# Patient Record
Sex: MALE | Race: WHITE | Employment: FULL TIME | URBAN - METROPOLITAN AREA
[De-identification: names, ages, dates, MRNs, and addresses within clinical notes are randomized per-mention and may not be internally consistent; named-entity substitution may affect disease eponyms.]

---

## 2017-09-27 RX ORDER — ESCITALOPRAM OXALATE 10 MG/1
20 TABLET ORAL DAILY
Qty: 90 TAB | Refills: 3 | OUTPATIENT
Start: 2017-09-27

## 2017-09-27 NOTE — TELEPHONE ENCOUNTER
Requested Prescriptions     Pending Prescriptions Disp Refills    escitalopram oxalate (LEXAPRO) 10 mg tablet 90 Tab 3     Sig: Take 2 Tabs by mouth daily.        Last Refill: 7-18-16  Last visit:5-16-16

## 2017-10-09 PROBLEM — I10 ESSENTIAL HYPERTENSION: Status: ACTIVE | Noted: 2017-10-09

## 2017-10-09 PROBLEM — N20.0 RECURRENT KIDNEY STONES: Status: ACTIVE | Noted: 2017-10-09

## 2017-10-09 PROBLEM — F41.0 PANIC ANXIETY SYNDROME: Status: ACTIVE | Noted: 2017-10-09

## 2017-10-18 ENCOUNTER — OFFICE VISIT (OUTPATIENT)
Dept: INTERNAL MEDICINE CLINIC | Age: 37
End: 2017-10-18

## 2017-10-18 VITALS
WEIGHT: 285.6 LBS | DIASTOLIC BLOOD PRESSURE: 82 MMHG | HEIGHT: 69 IN | SYSTOLIC BLOOD PRESSURE: 122 MMHG | HEART RATE: 80 BPM | BODY MASS INDEX: 42.3 KG/M2

## 2017-10-18 DIAGNOSIS — I10 ESSENTIAL HYPERTENSION: Primary | ICD-10-CM

## 2017-10-18 DIAGNOSIS — F17.210 CIGARETTE NICOTINE DEPENDENCE WITHOUT COMPLICATION: ICD-10-CM

## 2017-10-18 DIAGNOSIS — F41.0 PANIC ANXIETY SYNDROME: ICD-10-CM

## 2017-10-18 LAB
ALBUMIN SERPL-MCNC: 4.6 G/DL (ref 3.9–5.4)
ALKALINE PHOS POC: 88 U/L (ref 38–126)
ALT SERPL-CCNC: 57 U/L (ref 9–52)
AST SERPL-CCNC: 35 U/L (ref 14–36)
BACTERIA UA POCT, BACTPOCT: NORMAL
BILIRUB UR QL STRIP: NEGATIVE
BUN BLD-MCNC: 10 MG/DL (ref 9–20)
CALCIUM BLD-MCNC: 9.9 MG/DL (ref 8.4–10.2)
CASTS UA POCT: 0
CHLORIDE BLD-SCNC: 103 MMOL/L (ref 98–107)
CHOLEST SERPL-MCNC: 222 MG/DL (ref 0–200)
CLUE CELLS, CLUEPOCT: NEGATIVE
CO2 POC: 27 MMOL/L (ref 22–32)
CREAT BLD-MCNC: 1 MG/DL (ref 0.8–1.5)
CRYSTALS UA POCT, CRYSPOCT: NEGATIVE
EGFR (POC): 95.1
EPITHELIAL CELLS POCT: NEGATIVE
GLUCOSE POC: 85 MG/DL (ref 75–110)
GLUCOSE UR-MCNC: NEGATIVE MG/DL
GRAN# POC: 9.3 K/UL (ref 2–7.8)
GRAN% POC: 71.7 % (ref 37–92)
HCT VFR BLD CALC: 50.3 % (ref 37–51)
HDLC SERPL-MCNC: 54 MG/DL (ref 35–130)
HGB BLD-MCNC: 17.4 G/DL (ref 12–18)
KETONES P FAST UR STRIP-MCNC: NEGATIVE MG/DL
LDL CHOLESTEROL POC: 117.4 MG/DL (ref 0–130)
LY# POC: 3.2 K/UL (ref 0.6–4.1)
LY% POC: 24.8 % (ref 10–58.5)
MCH RBC QN: 32.7 PG (ref 26–32)
MCHC RBC-ENTMCNC: 34.6 G/DL (ref 30–36)
MCV RBC: 95 FL (ref 80–97)
MID #, POC: 0.4 K/UL (ref 0–1.8)
MID% POC: 3.5 % (ref 0.1–24)
MUCUS UA POCT, MUCPOCT: NORMAL
PH UR STRIP: 6.5 [PH] (ref 5–7)
PLATELET # BLD: 303 K/UL (ref 140–440)
POTASSIUM SERPL-SCNC: 4.8 MMOL/L (ref 3.6–5)
PROT SERPL-MCNC: 8 G/DL (ref 6.3–8.2)
PROT UR QL STRIP: NEGATIVE MG/DL
RBC # BLD: 5.3 M/UL (ref 4.2–6.3)
RBC UA POCT, RBCPOCT: NORMAL
SODIUM SERPL-SCNC: 144 MMOL/L (ref 137–145)
SP GR UR STRIP: 1.01 (ref 1.01–1.02)
TCHOL/HDL RATIO (POC): 4.1 (ref 0–4)
TOTAL BILIRUBIN POC: 0.5 MG/DL (ref 0.2–1.3)
TRICH UA POCT, TRICHPOC: NEGATIVE
TRIGL SERPL-MCNC: 253 MG/DL (ref 0–200)
TSH BLD-ACNC: 1.32 UIU/ML (ref 0.4–4.2)
UA UROBILINOGEN AMB POC: NORMAL (ref 0.2–1)
URINALYSIS CLARITY POC: CLEAR
URINALYSIS COLOR POC: NORMAL
URINE BLOOD POC: NEGATIVE
URINE CULT COMMENT, POCT: NORMAL
URINE LEUKOCYTES POC: NEGATIVE
URINE NITRITES POC: NEGATIVE
VLDLC SERPL CALC-MCNC: 50.6 MG/DL
WBC # BLD: 12.9 K/UL (ref 4.1–10.9)
WBC UA POCT, WBCPOCT: 0
YEAST UA POCT, YEASTPOC: NEGATIVE

## 2017-10-18 RX ORDER — ESCITALOPRAM OXALATE 20 MG/1
20 TABLET ORAL DAILY
Qty: 90 TAB | Refills: 3 | Status: SHIPPED | OUTPATIENT
Start: 2017-10-18 | End: 2018-08-06 | Stop reason: SDUPTHER

## 2017-10-18 RX ORDER — LOSARTAN POTASSIUM 50 MG/1
50 TABLET ORAL DAILY
Qty: 90 TAB | Refills: 3 | Status: SHIPPED | OUTPATIENT
Start: 2017-10-18 | End: 2020-05-21

## 2017-10-18 NOTE — PROGRESS NOTES
This note will not be viewable in [address] Subjective:     Jose D Lares presents to the office today in 1 year follow-up of a number of medical issues    The patient has hypertension and remains on losartan. He has tolerated this without cough or lower extremity edema and denies dizziness. Blood pressures when checked outside of the office have been normal.    The patient has a history of panic anxiety syndrome. He took his Lexapro until 4-6 weeks ago and then came off the medication. His symptoms have slowly returned and he wishes to restart his medication. He notes that he has anxiety with intermittent panic but this is not disabling. He had no long-term side effects related to the Lexapro. Past Medical History:   Diagnosis Date    Cigarette nicotine dependence without complication 98/13/1477    Essential hypertension 10/9/2017    Panic anxiety syndrome 10/9/2017    Recurrent kidney stones 10/9/2017     History reviewed. No pertinent surgical history. No Known Allergies  Current Outpatient Prescriptions   Medication Sig Dispense Refill    escitalopram oxalate (LEXAPRO) 20 mg tablet Take 1 Tab by mouth daily. 90 Tab 3    losartan (COZAAR) 50 mg tablet Take 1 Tab by mouth daily.  80 Tab 3     Social History     Social History    Marital status:      Spouse name: N/A    Number of children: N/A    Years of education: N/A     Social History Main Topics    Smoking status: Current Every Day Smoker    Smokeless tobacco: Current User    Alcohol use None    Drug use: None    Sexual activity: Not Asked     Other Topics Concern    None     Social History Narrative     Family History   Problem Relation Age of Onset    Cancer Father     Heart Disease Father        Review of Systems:  GEN: no weight loss, weight gain, fatigue or night sweats  CV: no PND, orthopnea, or palpitations  Resp: no dyspnea on exertion, no cough  Abd: no nausea, vomiting or diarrhea  EXT: denies edema, claudication  Endocrine: no hair loss, excessive thirst or polyuria  Neurological ROS: no TIA or stroke symptoms  Psych: Positive for reoccurance of anxiety with occasional panic  ROS otherwise negative      Objective:     Visit Vitals    /82 (BP 1 Location: Left arm, BP Patient Position: Sitting)    Pulse 80    Ht 5' 9\" (1.753 m)    Wt 285 lb 9.6 oz (129.5 kg)    BMI 42.18 kg/m2     Body mass index is 42.18 kg/(m^2). General:   alert, cooperative and no distress   Eyes: conjunctivae/sclerae clear. PERRL, EOM's intact   Mouth:  No oral lesions, no pharyngeal erythema, no exudates   Neck: Trachea midline, no thyromegaly, no bruits   Heart: S1 and S2 normal,no murmurs noted    Lungs: Clear to auscultation bilaterally, no increased work of breathing   Abdomen: Soft, nontender. Normal bowel sounds   Extremities: No edema or cyanosis   Neuro: ..alert, oriented x3,speech normal in context and clarity, cranial nerves II-XII intact,motor strength: full proximally and distally,gait: normal  reflexes: full and symmetric     Physical exam otherwise negative         Assessment/Plan:     Diagnoses and all orders for this visit:    Essential hypertension  -     AMB POC COMPLETE CBC,AUTOMATED ENTER  -     AMB POC COMPREHENSIVE METABOLIC PANEL  -     AMB POC LIPID PROFILE  -     AMB POC URINALYSIS DIP STICK AUTO W/ MICRO   -     UT COLLECTION VENOUS BLOOD,VENIPUNCTURE  -     losartan (COZAAR) 50 mg tablet; Take 1 Tab by mouth daily. , Normal, Disp-90 Tab, R-3    Panic anxiety syndrome  -     AMB POC TSH  -     escitalopram oxalate (LEXAPRO) 20 mg tablet; Take 1 Tab by mouth daily. , Normal, Disp-90 Tab, R-3    Cigarette nicotine dependence without complication        Other instructions: The patient's medications are reviewed and reconciled and no change in his current medical regimen is made. Refills are given as noted.     No added salt, weight loss are encouraged    He was counseled to discontinue smoking    Await results of multiple labs    Follow-up yearly    Follow-up Disposition:  Return in about 1 year (around 10/18/2018).     Kelley Mcallister MD

## 2017-10-18 NOTE — PATIENT INSTRUCTIONS

## 2017-10-18 NOTE — MR AVS SNAPSHOT
Visit Information Date & Time Provider Department Dept. Phone Encounter #  
 10/18/2017  8:40 AM [facility] [id number] Follow-up Instructions Return in about 1 year (around 10/18/2018). Upcoming Health Maintenance Date Due DTaP/Tdap/Td series (1 - Tdap) 7/13/2000 INFLUENZA AGE 9 TO ADULT 8/1/2017 Allergies as of 10/18/2017  Review Complete On: 10/18/2017 By: Yfn Rodriguez MD  
 No Known Allergies Current Immunizations  Never Reviewed No immunizations on file. Not reviewed this visit You Were Diagnosed With   
  
 Codes Comments Essential hypertension    -  Primary ICD-10-CM: I10 
ICD-9-CM: 401.9 Panic anxiety syndrome     ICD-10-CM: F41.0 ICD-9-CM: 300.01 Cigarette nicotine dependence without complication     PDZ-95-: F17.210 ICD-9-CM: 305.1 Vitals BP Pulse Height(growth percentile) Weight(growth percentile) BMI Smoking Status 122/82 (BP 1 Location: Left arm, BP Patient Position: Sitting) 80 5' 9\" (1.753 m) 285 lb 9.6 oz (129.5 kg) 42.18 kg/m2 Current Every Day Smoker BMI and BSA Data Body Mass Index Body Surface Area  
 42.18 kg/m 2 2.51 m 2 Preferred Pharmacy Pharmacy Name Phone [id number] [facility] [phone] Your Updated Medication List  
  
   
This list is accurate as of: 10/18/17  9:06 AM.  Always use your most recent med list.  
  
  
  
  
 escitalopram oxalate 20 mg tablet Commonly known as:  [place] Take 1 Tab by mouth daily. losartan 50 mg tablet Commonly known as:  COZAAR Take 1 Tab by mouth daily. Prescriptions Sent to Pharmacy Refills  
 escitalopram oxalate (LEXAPRO) 20 mg tablet 3 Sig: Take 1 Tab by mouth daily.   
 Class: Normal  
 Pharmacy: [id number] - [address] , [address] [place] [address] Ph #: [phone] Route: Oral  
 losartan (COZAAR) 50 mg tablet 3 Sig: Take 1 Tab by mouth daily. Class: Normal  
 Pharmacy: [facility] , [address] [place] [address] Ph #: [phone] Route: Oral  
  
We Performed the Following AMB POC COMPLETE CBC,AUTOMATED ENTER [id number] CPT(R)] AMB POC COMPREHENSIVE METABOLIC PANEL [15415 CPT(R)] AMB POC LIPID PROFILE [78996 CPT(R)] AMB POC TSH [32361 CPT(R)] AMB POC URINALYSIS DIP STICK AUTO W/ MICRO  [96990 CPT(R)] MA COLLECTION VENOUS BLOOD,VENIPUNCTURE [id number] CPT(R)] Follow-up Instructions Return in about 1 year (around 10/18/2018). Patient Instructions High Blood Pressure: Care Instructions Your Care Instructions If your blood pressure is usually above 140/90, you have high blood pressure, or hypertension. That means the top number is 140 or higher or the bottom number is 90 or higher, or both. Despite what a lot of people think, high blood pressure usually doesn't cause headaches or make you feel dizzy or lightheaded. It usually has no symptoms. But it does increase your risk for heart attack, stroke, and kidney or eye damage. The higher your blood pressure, the more your risk increases. Your doctor will give you a goal for your blood pressure. Your goal will be based on your health and your age. An example of a goal is to keep your blood pressure below 140/90. Lifestyle changes, such as eating healthy and being active, are always important to help lower blood pressure. You might also take medicine to reach your blood pressure goal. 
Follow-up care is a key part of your treatment and safety. Be sure to make and go to all appointments, and call your doctor if you are having problems.  It's also a good idea to know your test results and keep a list of the medicines you take. How can you care for yourself at home? Medical treatment · If you stop taking your medicine, your blood pressure will go back up. You may take one or more types of medicine to lower your blood pressure. Be safe with medicines. Take your medicine exactly as prescribed. Call your doctor if you think you are having a problem with your medicine. · Talk to your doctor before you start taking aspirin every day. Aspirin can help certain people lower their risk of a heart attack or stroke. But taking aspirin isn't right for everyone, because it can cause serious bleeding. · See your doctor regularly. You may need to see the doctor more often at first or until your blood pressure comes down. · If you are taking blood pressure medicine, talk to your doctor before you take decongestants or anti-inflammatory medicine, such as ibuprofen. Some of these medicines can raise blood pressure. · Learn how to check your blood pressure at home. Lifestyle changes · Stay at a healthy weight. This is especially important if you put on weight around the waist. Losing even 10 pounds can help you lower your blood pressure. · If your doctor recommends it, get more exercise. Walking is a good choice. Bit by bit, increase the amount you walk every day. Try for at least 30 minutes on most days of the week. You also may want to swim, bike, or do other activities. · Avoid or limit alcohol. Talk to your doctor about whether you can drink any alcohol. · Try to limit how much sodium you eat to less than 2,300 milligrams (mg) a day. Your doctor may ask you to try to eat less than 1,500 mg a day. · Eat plenty of fruits (such as bananas and oranges), vegetables, legumes, whole grains, and low-fat dairy products. · Lower the amount of saturated fat in your diet. Saturated fat is found in animal products such as milk, cheese, and meat.  Limiting these foods may help you lose weight and also lower your risk for heart disease. · Do not smoke. Smoking increases your risk for heart attack and stroke. If you need help quitting, talk to your doctor about stop-smoking programs and medicines. These can increase your chances of quitting for good. When should you call for help? Call 911 anytime you think you may need emergency care. This may mean having symptoms that suggest that your blood pressure is causing a serious heart or blood vessel problem. Your blood pressure may be over 180/110. For example, call 911 if: 
· You have symptoms of a heart attack. These may include: ¨ Chest pain or pressure, or a strange feeling in the chest. 
¨ Sweating. ¨ Shortness of breath. ¨ Nausea or vomiting. ¨ Pain, pressure, or a strange feeling in the back, neck, jaw, or upper belly or in one or both shoulders or arms. ¨ Lightheadedness or sudden weakness. ¨ A fast or irregular heartbeat. · You have symptoms of a stroke. These may include: 
¨ Sudden numbness, tingling, weakness, or loss of movement in your face, arm, or leg, especially on only one side of your body. ¨ Sudden vision changes. ¨ Sudden trouble speaking. ¨ Sudden confusion or trouble understanding simple statements. ¨ Sudden problems with walking or balance. ¨ A sudden, severe headache that is different from past headaches. · You have severe back or belly pain. Do not wait until your blood pressure comes down on its own. Get help right away. Call your doctor now or seek immediate care if: 
· Your blood pressure is much higher than normal (such as 180/110 or higher), but you don't have symptoms. · You think high blood pressure is causing symptoms, such as: ¨ Severe headache. ¨ Blurry vision. Watch closely for changes in your health, and be sure to contact your doctor if: 
· Your blood pressure measures 140/90 or higher at least 2 times.  That means the top number is 140 or higher or the bottom number is 90 or higher, or both. · You think you may be having side effects from your blood pressure medicine. · Your blood pressure is usually normal, but it goes above normal at least 2 times. Where can you learn more? Go to http://murray-randy.info/. Enter T266 in the search box to learn more about \"High Blood Pressure: Care Instructions. \" Current as of: August 8, 2016 Content Version: 11.3 © 7539-5749 [employer]. Care instructions adapted under license by [employer] (which disclaims liability or warranty for this information). If you have questions about a medical condition or this instruction, always ask your healthcare professional. [address] any warranty or liability for your use of this information. Introducing [facility]! Kelley Lauren introduces MyQuoteApp patient portal. Now you can access parts of your medical record, email your doctor's office, and request medication refills online. 1. In your internet browser, go to [email or web address] 2. Click on the First Time User? Click Here link in the Sign In box. You will see the New Member Sign Up page. 3. Enter your MyQuoteApp Access Code exactly as it appears below. You will not need to use this code after youve completed the sign-up process. If you do not sign up before the expiration date, you must request a new code. · MyQuoteApp Access Code: [email or web address] Expires: 1/16/2018  8:48 AM 
 
4. Enter the last four digits of your Social Security Number (xxxx) and Date of Birth (mm/dd/yyyy) as indicated and click Submit. You will be taken to the next sign-up page. 5. Create a MyQuoteApp ID. This will be your MyQuoteApp login ID and cannot be changed, so think of one that is secure and easy to remember. 6. Create a MyQuoteApp password. You can change your password at any time. 7. Enter your Password Reset Question and Answer.  This can be used at a later time if you forget your password. 8. Enter your e-mail address. You will receive e-mail notification when new information is available in [address] 9. Click Sign Up. You can now view and download portions of your medical record. 10. Click the Download Summary menu link to download a portable copy of your medical information. If you have questions, please visit the Frequently Asked Questions section of the Fliggo website. Remember, Fliggo is NOT to be used for urgent needs. For medical emergencies, dial 911. Now available from your iPhone and Android! Please provide this summary of care documentation to your next provider. Your primary care clinician is listed as BALJINDER Jo 21. If you have any questions after today's visit, please call [phone].

## 2017-10-18 NOTE — PROGRESS NOTES
Mendel Mcclendon is a 44 y.o. male presenting for Hypertension (1 yr fu)  . 1. Have you been to the ER, urgent care clinic since your last visit? Hospitalized since your last visit? No    2. Have you seen or consulted any other health care providers outside of the [address] [place] [address] since your last visit? Include any pap smears or colon screening. No    No flowsheet data found. No flowsheet data found. PHQ over the last two weeks 10/18/2017   Little interest or pleasure in doing things Not at all   Feeling down, depressed or hopeless Not at all   Total Score PHQ 2 0       There are no discontinued medications.

## 2020-05-21 ENCOUNTER — OFFICE VISIT (OUTPATIENT)
Dept: URGENT CARE | Age: 40
End: 2020-05-21

## 2020-05-21 VITALS — OXYGEN SATURATION: 95 % | RESPIRATION RATE: 18 BRPM | HEART RATE: 66 BPM | TEMPERATURE: 98.5 F

## 2020-05-21 DIAGNOSIS — Z20.822 CLOSE EXPOSURE TO COVID-19 VIRUS: Primary | ICD-10-CM

## 2020-05-21 NOTE — PROGRESS NOTES
This patient was seen in [facility] at [address] [place] [address] Urgent Care while in their vehicle due to COVID-19 pandemic with PPE and focused examination in order to decrease community viral transmission. The patient/guardian gave verbal consent to treat. Socrates Spangler is a 35 y.o. male who presents for COVID-19 testing. Had direct contact with someone at work with + COVID-19. Denies cough, SOB, fever. No PMH. Smoker. The history is provided by the patient. Past Medical History:   Diagnosis Date    Cigarette nicotine dependence without complication 61/41/5225    Essential hypertension 10/9/2017    Panic anxiety syndrome 10/9/2017    Recurrent kidney stones 10/9/2017        History reviewed. No pertinent surgical history.       Family History   Problem Relation Age of Onset    Cancer Father     Heart Disease Father         Social History     Socioeconomic History    Marital status:      Spouse name: Not on file    Number of children: Not on file    Years of education: Not on file    Highest education level: Not on file   Occupational History    Not on file   Social Needs    Financial resource strain: Not on file    Food insecurity     Worry: Not on file     Inability: Not on file    Transportation needs     Medical: Not on file     Non-medical: Not on file   Tobacco Use    Smoking status: Current Every Day Smoker    Smokeless tobacco: Current User   Substance and Sexual Activity    Alcohol use: Not on file    Drug use: Not on file    Sexual activity: Not on file   Lifestyle    Physical activity     Days per week: Not on file     Minutes per session: Not on file    Stress: Not on file   Relationships    Social connections     Talks on phone: Not on file     Gets together: Not on file     Attends Hoahaoism service: Not on file     Active member of club or organization: Not on file     Attends meetings of clubs or organizations: Not on file     Relationship status: Not on file    Intimate partner violence     Fear of current or ex partner: Not on file     Emotionally abused: Not on file     Physically abused: Not on file     Forced sexual activity: Not on file   Other Topics Concern    Not on file   Social History Narrative    Not on file                ALLERGIES: Patient has no known allergies. Review of Systems   Constitutional: Negative for chills and fever. HENT: Negative for congestion and sore throat. Respiratory: Negative for cough, shortness of breath and wheezing. Cardiovascular: Negative for chest pain. Gastrointestinal: Negative for abdominal pain, nausea and vomiting. Musculoskeletal: Negative for myalgias. Neurological: Negative for headaches. Vitals:    05/21/20 1321 05/21/20 1343   Pulse: (!) 122 66   Resp: 18    Temp: 98.5 °F (36.9 °C)    SpO2: 91% 95%       Physical Exam  Vitals signs and nursing note reviewed. Constitutional:       General: He is not in acute distress. Appearance: He is well-developed. He is not diaphoretic. Pulmonary:      Effort: Pulmonary effort is normal. No respiratory distress. Breath sounds: Normal breath sounds. No stridor. No wheezing, rhonchi or rales. Neurological:      Mental Status: He is alert. Psychiatric:         Behavior: Behavior normal.         Thought Content:  Thought content normal.         Judgment: Judgment normal.         MDM    ICD-10-CM ICD-9-CM   1. Close exposure to COVID-19 virus Z20.828 V01.79       Orders Placed This Encounter    NOVEL CORONAVIRUS (COVID-19)     Scheduling Instructions:      1) Due to current limited availability of the COVID-19 PCR test, tests will be prioritized and may not be completed.              2) Order only if the test result will change clinical management or necessary for a return to mission-critical employment decision.              3) Print and instruct patient to adhere to [facility] home isolation program. (Link Above)              4) Set up or refer patient for a monitoring program.              5) Have patient sign up for and leverage MyChart (if not previously done). Quarantine, await result  Deep breathing exercises    If signs and symptoms become worse the pt is to go to the ER.          Procedures

## 2020-05-24 LAB — SARS-COV-2, NAA: NOT DETECTED

## 2021-04-06 ENCOUNTER — OFFICE VISIT (OUTPATIENT)
Dept: INTERNAL MEDICINE CLINIC | Age: 41
End: 2021-04-06
Payer: COMMERCIAL

## 2021-04-06 VITALS
HEART RATE: 107 BPM | OXYGEN SATURATION: 96 % | BODY MASS INDEX: 44.97 KG/M2 | SYSTOLIC BLOOD PRESSURE: 144 MMHG | HEIGHT: 69 IN | TEMPERATURE: 98.8 F | RESPIRATION RATE: 16 BRPM | DIASTOLIC BLOOD PRESSURE: 110 MMHG | WEIGHT: 303.6 LBS

## 2021-04-06 DIAGNOSIS — Z71.3 DIETARY COUNSELING: ICD-10-CM

## 2021-04-06 DIAGNOSIS — G47.33 OSA (OBSTRUCTIVE SLEEP APNEA): ICD-10-CM

## 2021-04-06 DIAGNOSIS — I10 ESSENTIAL HYPERTENSION: Primary | ICD-10-CM

## 2021-04-06 DIAGNOSIS — E78.00 HYPERCHOLESTEROLEMIA: ICD-10-CM

## 2021-04-06 DIAGNOSIS — Z72.0 TOBACCO ABUSE: ICD-10-CM

## 2021-04-06 DIAGNOSIS — E66.01 MORBID OBESITY (HCC): ICD-10-CM

## 2021-04-06 DIAGNOSIS — R06.2 WHEEZING: ICD-10-CM

## 2021-04-06 DIAGNOSIS — Z71.6 TOBACCO ABUSE COUNSELING: ICD-10-CM

## 2021-04-06 DIAGNOSIS — R40.0 DAYTIME SOMNOLENCE: ICD-10-CM

## 2021-04-06 DIAGNOSIS — R00.0 TACHYCARDIA: ICD-10-CM

## 2021-04-06 DIAGNOSIS — R06.83 LOUD SNORING: ICD-10-CM

## 2021-04-06 PROCEDURE — 99214 OFFICE O/P EST MOD 30 MIN: CPT | Performed by: NURSE PRACTITIONER

## 2021-04-06 PROCEDURE — 93000 ELECTROCARDIOGRAM COMPLETE: CPT | Performed by: NURSE PRACTITIONER

## 2021-04-06 RX ORDER — ALBUTEROL SULFATE 90 UG/1
1 AEROSOL, METERED RESPIRATORY (INHALATION)
Qty: 1 INHALER | Refills: 5 | Status: SHIPPED | OUTPATIENT
Start: 2021-04-06 | End: 2022-06-06 | Stop reason: SDUPTHER

## 2021-04-06 RX ORDER — METOPROLOL SUCCINATE 50 MG/1
50 TABLET, EXTENDED RELEASE ORAL DAILY
Qty: 30 TAB | Refills: 2 | Status: SHIPPED | OUTPATIENT
Start: 2021-04-06 | End: 2021-04-19 | Stop reason: DRUGHIGH

## 2021-04-06 NOTE — PROGRESS NOTES
Mauricio Paula is a 38 y.o. male    Chief Complaint   Patient presents with    [facility]     new patient    Sleep Problem    Labs       Visit Vitals  BP (!) 144/110 (BP 1 Location: Left upper arm, BP Patient Position: Sitting, BP Cuff Size: Large adult)   Pulse (!) 107   Temp 98.8 °F (37.1 °C)   Resp 16   Ht 5' 9\" (1.753 m)   Wt 303 lb 9.6 oz (137.7 kg)   SpO2 96%   BMI 44.83 kg/m²           1. Have you been to the ER, urgent care clinic since your last visit? Hospitalized since your last visit? No     2. Have you seen or consulted any other health care providers outside of the [address] [place] [address] since your last visit? Include any pap smears or colon screening.  No

## 2021-04-06 NOTE — PROGRESS NOTES
Establish Care (new patient), Sleep Problem, and Labs       HPI:     Aleisha Sandra is a 38y.o. year old male who is here to establish as a new patient to me, and to discuss current medical concerns and history. The patient states he has previously been treated for hypertension. He states he was taking medication for a period of time, but then did not follow-up accordingly, and ran out of his medicine. This has been over 2 years ago. He states he has not been checking his blood pressure since that time, but is concerned about his need for medication to treat. He also states a history of obstructive sleep apnea, but does not have a CPAP machine. He states he snores excessively and loudly at night, and has a witnessed episode of apnea. He would like a repeat sleep study done if possible. In addition, the patient continues to smoke despite health risks. He does have a desire to quit smoking, but has not tried any options to date. He does state that he often wakes up wheezing in the morning, and has to cough quite a bit to clear his lungs in the morning. He has tried an old albuterol inhaler which he states is helpful to reduce his wheezing. He denies any chest pain, chest pressure, palpitations, shortness of breath, headaches, severe dizziness, or syncope episodes. He does drink coffee in the morning, and occasionally Red Bulls or other caffeinated beverages in the daytime. Visit Vitals  BP (!) 144/110 (BP 1 Location: Left upper arm, BP Patient Position: Sitting, BP Cuff Size: Large adult)   Pulse (!) 107   Temp 98.8 °F (37.1 °C)   Resp 16   Ht 5' 9\" (1.753 m)   Wt 303 lb 9.6 oz (137.7 kg)   SpO2 96%   BMI 44.83 kg/m²       Past Medical History:   Diagnosis Date    Cigarette nicotine dependence without complication 54/99/0693    Essential hypertension 10/9/2017    Panic anxiety syndrome 10/9/2017    Recurrent kidney stones 10/9/2017       History reviewed.  No pertinent surgical history. Prior to Admission medications    Medication Sig Start Date End Date Taking? Authorizing Provider   albuterol (PROVENTIL HFA, VENTOLIN HFA, PROAIR HFA) 90 mcg/actuation inhaler Take 1 Puff by inhalation every four (4) hours as needed for Wheezing. Indications: WHEEZING 4/6/21  Yes Rosy BRYSON NP   metoprolol succinate (TOPROL-XL) 50 mg XL tablet Take 1 Tab by mouth daily. Indications: rapid ventricular heartbeat 4/6/21  Yes Candi Chapin NP        No Known Allergies     Social History     Socioeconomic History    Marital status:      Spouse name: Not on file    Number of children: Not on file    Years of education: Not on file    Highest education level: Not on file   Occupational History    Not on file   Social Needs    Financial resource strain: Not on file    Food insecurity     Worry: Not on file     Inability: Not on file    Transportation needs     Medical: Not on file     Non-medical: Not on file   Tobacco Use    Smoking status: Current Every Day Smoker    Smokeless tobacco: Current User   Substance and Sexual Activity    Alcohol use: Yes    Drug use: Not on file    Sexual activity: Not on file   Lifestyle    Physical activity     Days per week: Not on file     Minutes per session: Not on file    Stress: Not on file   Relationships    Social connections     Talks on phone: Not on file     Gets together: Not on file     Attends Yazidism service: Not on file     Active member of club or organization: Not on file     Attends meetings of clubs or organizations: Not on file     Relationship status: Not on file    Intimate partner violence     Fear of current or ex partner: Not on file     Emotionally abused: Not on file     Physically abused: Not on file     Forced sexual activity: Not on file   Other Topics Concern    Not on file   Social History Narrative    Not on file        ROS:     Constitutional: He denies fevers, weight loss, or night sweats.   Eyes: No blurred or double vision. ENT: No difficulty with swallowing, taste, speech or smell. Respiratory: Positive for coughing and wheezing, but without significant shortness of breath. Cardiovascular: Denies chest pain, palpitations, unexplained indigestion or syncope. Gastrointestinal:  No changes in bowel movements, no abdominal pain, no bloating. Neurological:  No syncope, dizziness or frequent headache  Skin:  No recent rashes or unexplained bruising. Psychiatric/Behavioral:  Negative for depression. Hematologic: no easy bruising or bleeding gums  Lymphatic: no lymph node enlargement or night sweats  Endocrine: no increased urination or increased thirst, w/o rapid weight change and no night sweats      Physical Examination:     Vitals:    04/06/21 0813   BP: (!) 144/110   Pulse: (!) 107   Resp: 16   Temp: 98.8 °F (37.1 °C)   SpO2: 96%   Weight: 303 lb 9.6 oz (137.7 kg)   Height: 5' 9\" (1.753 m)   PainSc:   0 - No pain        General appearance - alert, well appearing, and in no apparent distress  Mental status - alert, oriented to person, place, and time  Eyes - pupillary responses were normal.  Extraocular muscle function intact. Lids and conjunctiva not injected. Neck - supple without thyromegaly or burit. No JVD noted  Lungs -diffuse wheezing to auscultation. No sounds of consolidation heard  Cardiac-tachycardic rate, regular rhythm without significant murmurs. PMI not displaced. No gallop, rub or click  Lymphatics - no palpable lymphadenopathy. Skin - no rash or unusual bruising noted  Neurological - Cranial nerves II-XII grossly intact. Station and gait normal  Psychiatric -pleasant and cooperative. Time: Duration of encounter was approximately 35 to 40 minutes in which patient was counseled and educated extensively on health risks related to: Morbid obesity, uncontrolled hypertension, uncontrolled sleep apnea, and tobacco abuse.   Patient states improved understanding of health risks related to these issues. Assessment/Plan:     Orders Placed This Encounter    75173 - At Home Sleep Study    CBC W/O DIFF     Standing Status:   Future     Number of Occurrences:   1     Standing Expiration Date:   4/6/2022    TSH 3RD GENERATION     Standing Status:   Future     Number of Occurrences:   1     Standing Expiration Date:   4/6/2022    URINALYSIS W/ RFLX MICROSCOPIC     Standing Status:   Future     Number of Occurrences:   1     Standing Expiration Date:   4/6/2022    MICROALBUMIN, UR, RAND    LIPID PANEL     Standing Status:   Future     Number of Occurrences:   1     Standing Expiration Date:   2/9/6639    METABOLIC PANEL, COMPREHENSIVE     Standing Status:   Future     Number of Occurrences:   1     Standing Expiration Date:   4/6/2022    AMB POC EKG ROUTINE W/ 12 LEADS, INTER & REP     Order Specific Question:   Reason for Exam:     Answer:   htn    albuterol (PROVENTIL HFA, VENTOLIN HFA, PROAIR HFA) 90 mcg/actuation inhaler     Sig: Take 1 Puff by inhalation every four (4) hours as needed for Wheezing. Indications: WHEEZING     Dispense:  1 Inhaler     Refill:  5    metoprolol succinate (TOPROL-XL) 50 mg XL tablet     Sig: Take 1 Tab by mouth daily. Indications: rapid ventricular heartbeat     Dispense:  30 Tab     Refill:  2        ICD-10-CM ICD-9-CM    1. Essential hypertension  I10 401.9 CBC W/O DIFF      TSH 3RD GENERATION      URINALYSIS W/ RFLX MICROSCOPIC      MICROALBUMIN, UR, RAND      LIPID PANEL      METABOLIC PANEL, COMPREHENSIVE      CBC W/O DIFF      TSH 3RD GENERATION      URINALYSIS W/ RFLX MICROSCOPIC      LIPID PANEL      METABOLIC PANEL, COMPREHENSIVE      AMB POC EKG ROUTINE W/ 12 LEADS, INTER & REP   2. Tachycardia  R00.0 785.0 TSH 3RD GENERATION      TSH 3RD GENERATION      AMB POC EKG ROUTINE W/ 12 LEADS, INTER & REP      metoprolol succinate (TOPROL-XL) 50 mg XL tablet   3.  LAURA (obstructive sleep apnea)  G47.33 327.23 SLEEP STUDY, UNATTENDED, SIMUL RECORD HR/O2 SAT/RESP FLOW/RESP EFF   4. Morbid obesity (HCC)  E66.01 278.01 TSH 3RD GENERATION      LIPID PANEL      METABOLIC PANEL, COMPREHENSIVE      TSH 3RD GENERATION      LIPID PANEL      METABOLIC PANEL, COMPREHENSIVE   5. Tobacco abuse  Z72.0 305.1    6. Tobacco abuse counseling  Z71.6 V65.42      305.1    7. Dietary counseling  Z71.3 V65.3    8. Wheezing  R06.2 786.07 albuterol (PROVENTIL HFA, VENTOLIN HFA, PROAIR HFA) 90 mcg/actuation inhaler   9. Loud snoring  R06.83 786.09 SLEEP STUDY, UNATTENDED, SIMUL RECORD HR/O2 SAT/RESP FLOW/RESP EFF   10. Daytime somnolence  R40.0 780.54 SLEEP STUDY, UNATTENDED, SIMUL RECORD HR/O2 SAT/RESP FLOW/RESP EFF     1: We will do baseline labs today including: CBC, CMP, lipid panel, TSH, urinalysis, and microalbumin. 2: Patient to start on Toprol-XL 50 mg daily for management of tachycardia and elevated blood pressure. I have explained to him the likely need for additional medication to improve blood pressure as well. Patient acknowledges. 3: Patient will be given a prescription for albuterol inhaler to use as needed for wheezing. 4: Smoking cessation discussed. Patient will consider in future. 5: Dietary counseling performed. Patient to try diabetic diet which is low in carbohydrates, but without elimination. 6: Patient to monitor blood pressures twice daily and record results. Bring to next appointment. 7: EKG performed in office today. No significant ST elevation/depression. No signs of T wave ischemia. 8: We will arrange for sleep study due to history of LAURA, loud snoring, and morbid obesity. 9: Patient to follow-up with me in approximately 2 weeks, or sooner as needed. Patient states understanding and agrees with plan. I have reviewed the patient's medical history in detail and updated the computerized patient record. We had a prolonged discussion about these complex clinical issues and went over the various important aspects to consider. All questions were answered. Advised him to call back or return to office if symptoms do not improve, change in nature, or persist.    He was given an after visit summary or informed of [employer] [facility] which includes patient instructions, diagnoses, current medications, & vitals. Signed,  Caesar Butler.  Jacky Santillan, MSN APRN [facility]

## 2021-04-07 LAB
APPEARANCE UR: CLEAR
BILIRUB UR QL STRIP: NEGATIVE
COLOR UR: YELLOW
GLUCOSE UR QL: NEGATIVE
HGB UR QL STRIP: NEGATIVE
KETONES UR QL STRIP: NEGATIVE
LEUKOCYTE ESTERASE UR QL STRIP: NEGATIVE
MICRO URNS: NORMAL
MICROALBUMIN UR-MCNC: 5.6 UG/ML
NITRITE UR QL STRIP: NEGATIVE
PH UR STRIP: 5.5 [PH] (ref 5–7.5)
PROT UR QL STRIP: NEGATIVE
SP GR UR: 1.02 (ref 1–1.03)
UROBILINOGEN UR STRIP-MCNC: 0.2 MG/DL (ref 0.2–1)

## 2021-04-09 ENCOUNTER — APPOINTMENT (OUTPATIENT)
Dept: INTERNAL MEDICINE CLINIC | Age: 41
End: 2021-04-09

## 2021-04-10 LAB
ALBUMIN SERPL-MCNC: 4.7 G/DL (ref 4–5)
ALBUMIN/GLOB SERPL: 1.5 {RATIO} (ref 1.2–2.2)
ALP SERPL-CCNC: 101 IU/L (ref 39–117)
ALT SERPL-CCNC: 54 IU/L (ref 0–44)
AST SERPL-CCNC: 29 IU/L (ref 0–40)
BASOPHILS # BLD AUTO: 0.1 X10E3/UL (ref 0–0.2)
BASOPHILS NFR BLD AUTO: 1 %
BILIRUB SERPL-MCNC: 0.2 MG/DL (ref 0–1.2)
BUN SERPL-MCNC: 16 MG/DL (ref 6–24)
BUN/CREAT SERPL: 16 (ref 9–20)
CALCIUM SERPL-MCNC: 9.6 MG/DL (ref 8.7–10.2)
CHLORIDE SERPL-SCNC: 100 MMOL/L (ref 96–106)
CHOLEST SERPL-MCNC: 237 MG/DL (ref 100–199)
CO2 SERPL-SCNC: 23 MMOL/L (ref 20–29)
CREAT SERPL-MCNC: 1 MG/DL (ref 0.76–1.27)
EOSINOPHIL # BLD AUTO: 0.3 X10E3/UL (ref 0–0.4)
EOSINOPHIL NFR BLD AUTO: 2 %
ERYTHROCYTE [DISTWIDTH] IN BLOOD BY AUTOMATED COUNT: 12.9 % (ref 11.6–15.4)
GLOBULIN SER CALC-MCNC: 3.2 G/DL (ref 1.5–4.5)
GLUCOSE SERPL-MCNC: 83 MG/DL (ref 65–99)
HCT VFR BLD AUTO: 52.5 % (ref 37.5–51)
HDLC SERPL-MCNC: 50 MG/DL
HGB BLD-MCNC: 18.3 G/DL (ref 13–17.7)
IMM GRANULOCYTES # BLD AUTO: 0.1 X10E3/UL (ref 0–0.1)
IMM GRANULOCYTES NFR BLD AUTO: 1 %
LDLC SERPL CALC-MCNC: 153 MG/DL (ref 0–99)
LYMPHOCYTES # BLD AUTO: 3.1 X10E3/UL (ref 0.7–3.1)
LYMPHOCYTES NFR BLD AUTO: 27 %
MCH RBC QN AUTO: 32 PG (ref 26.6–33)
MCHC RBC AUTO-ENTMCNC: 34.9 G/DL (ref 31.5–35.7)
MCV RBC AUTO: 92 FL (ref 79–97)
MONOCYTES # BLD AUTO: 0.7 X10E3/UL (ref 0.1–0.9)
MONOCYTES NFR BLD AUTO: 6 %
NEUTROPHILS # BLD AUTO: 7.3 X10E3/UL (ref 1.4–7)
NEUTROPHILS NFR BLD AUTO: 63 %
PLATELET # BLD AUTO: 341 X10E3/UL (ref 150–450)
POTASSIUM SERPL-SCNC: 4.7 MMOL/L (ref 3.5–5.2)
PROT SERPL-MCNC: 7.9 G/DL (ref 6–8.5)
RBC # BLD AUTO: 5.72 X10E6/UL (ref 4.14–5.8)
SODIUM SERPL-SCNC: 139 MMOL/L (ref 134–144)
TRIGL SERPL-MCNC: 187 MG/DL (ref 0–149)
TSH SERPL DL<=0.005 MIU/L-ACNC: 1.05 UIU/ML (ref 0.45–4.5)
VLDLC SERPL CALC-MCNC: 34 MG/DL (ref 5–40)
WBC # BLD AUTO: 11.5 X10E3/UL (ref 3.4–10.8)

## 2021-04-12 RX ORDER — ATORVASTATIN CALCIUM 10 MG/1
10 TABLET, FILM COATED ORAL DAILY
Qty: 30 TAB | Refills: 5 | Status: SHIPPED | OUTPATIENT
Start: 2021-04-12 | End: 2021-11-08

## 2021-04-12 NOTE — PROGRESS NOTES
Your blood count shows that you are developing a condition secondary to smoking called polycythemia. This is in excess of hematocrit and hemoglobin. This is unhealthy, as smoking will cause the body to increase blood production unnecessarily. Increased counts can cause increased risk for heart attack and stroke. I would suggest to work on smoking cessation. Additionally, I would like you to start taking a low-dose 81 mg enteric-coated aspirin each day. This will help to keep the blood a bit thinner. Make sure you hydrate well each day with plenty of water. Thyroid function appears to be normal.    Your cholesterol levels are significantly elevated above normal.  To help protect you, I would suggest taking a medication that will help to lower these numbers to more appropriate levels. I will send this to your pharmacy. Take 1 tablet each night. Work on [employer] such as low-fat/low-cholesterol, reduction in red meat, plenty of vegetables that are rich in color, and plenty of fiber. Kidney functions appear normal.  Electrolytes are normal.  There is a slight elevation in one of your liver enzymes. We will keep an eye on this. Urinalysis is okay. Once cholesterol medication has started, we will need to recheck your labs in 3 months. Please make an appointment to arrange for this.

## 2021-04-19 ENCOUNTER — OFFICE VISIT (OUTPATIENT)
Dept: INTERNAL MEDICINE CLINIC | Age: 41
End: 2021-04-19
Payer: COMMERCIAL

## 2021-04-19 VITALS
OXYGEN SATURATION: 96 % | DIASTOLIC BLOOD PRESSURE: 92 MMHG | BODY MASS INDEX: 44.82 KG/M2 | TEMPERATURE: 98.4 F | HEART RATE: 82 BPM | RESPIRATION RATE: 16 BRPM | WEIGHT: 302.6 LBS | HEIGHT: 69 IN | SYSTOLIC BLOOD PRESSURE: 133 MMHG

## 2021-04-19 DIAGNOSIS — E66.01 MORBID OBESITY (HCC): ICD-10-CM

## 2021-04-19 DIAGNOSIS — Z71.6 TOBACCO ABUSE COUNSELING: ICD-10-CM

## 2021-04-19 DIAGNOSIS — I10 ESSENTIAL HYPERTENSION: Primary | ICD-10-CM

## 2021-04-19 DIAGNOSIS — R06.2 WHEEZING: ICD-10-CM

## 2021-04-19 DIAGNOSIS — Z72.0 TOBACCO ABUSE: ICD-10-CM

## 2021-04-19 DIAGNOSIS — E78.00 HYPERCHOLESTEROLEMIA: ICD-10-CM

## 2021-04-19 PROCEDURE — 99214 OFFICE O/P EST MOD 30 MIN: CPT | Performed by: NURSE PRACTITIONER

## 2021-04-19 RX ORDER — METOPROLOL SUCCINATE 100 MG/1
100 TABLET, EXTENDED RELEASE ORAL DAILY
Qty: 90 TAB | Refills: 1 | Status: SHIPPED | OUTPATIENT
Start: 2021-04-19 | End: 2021-10-22

## 2021-04-19 NOTE — PROGRESS NOTES
Larry Velasco is a 38 y.o. male    Chief Complaint   Patient presents with    Blood Pressure Check     2 wk follow up       Visit Vitals  BP (!) 133/92 (BP 1 Location: Left upper arm, BP Patient Position: Sitting, BP Cuff Size: Large adult)   Pulse 82   Temp 98.4 °F (36.9 °C)   Resp 16   Ht 5' 9\" (1.753 m)   Wt 302 lb 9.6 oz (137.3 kg)   SpO2 96%   BMI 44.69 kg/m²           1. Have you been to the ER, urgent care clinic since your last visit? Hospitalized since your last visit? No     2. Have you seen or consulted any other health care providers outside of the [address] [place] [address] since your last visit? Include any pap smears or colon screening.  No

## 2021-04-19 NOTE — PROGRESS NOTES
Chief Complaint   Patient presents with    Blood Pressure Check     2 wk follow up       SUBJECTIVE:    Larry Velasco is a 38 y.o. male who is here today for a follow up appointment regarding his hypertension, hypercholesterolemia, tobacco abuse, and obesity. At our last encounter, the patient was found to be quite hypertensive. He was placed on Toprol XL 50 mg daily and suggested to monitor his blood pressures on a regular basis. He states he had purchased a blood pressure monitor from [employer], but did not receive it until just recently. He plans on starting to do regular checks in the near future. He states he is tolerating the medication well, and denies any adverse side effects. He appreciates this, as the previous medication he had taken had caused some issues for him. He is currently being managed for hyperlipidemia, and is compliant with current medication(s) as listed. He denies any adverse side effects of medication(s) at this time, and states He is tolerating them well. The patient denies any symptoms of muscle or joint pain, weakness, change in bowel patterns, abdominal pain, or headaches. He is not entirely compliant with a low fat/low cholesterol diet at this time, but working on it, and is not purposefully exercising on a regular basis. He has had no appreciable weight loss since our last encounter. Additionally, the patient was found to be mildly polycythemic via his labs. This is likely due to continued smoking. I have discussed this with him today, and have advised smoking cessation, as well as starting an 81 mg baby aspirin daily. He states he will start the aspirin, and discuss smoking cessation options with me at a later time. The patient also has symptoms of sleep apnea, and has not completed his sleep study at this time. Current Outpatient Medications   Medication Sig Dispense Refill    metoprolol succinate (TOPROL-XL) 100 mg tablet Take 1 Tab by mouth daily. Indications: high blood pressure 90 Tab 1    atorvastatin (LIPITOR) 10 mg tablet Take 1 Tab by mouth daily. Indications: high cholesterol 30 Tab 5    albuterol (PROVENTIL HFA, VENTOLIN HFA, PROAIR HFA) 90 mcg/actuation inhaler Take 1 Puff by inhalation every four (4) hours as needed for Wheezing. Indications: WHEEZING 1 Inhaler 5     Past Medical History:   Diagnosis Date    Cigarette nicotine dependence without complication 82/77/9159    Essential hypertension 10/9/2017    Panic anxiety syndrome 10/9/2017    Recurrent kidney stones 10/9/2017     History reviewed. No pertinent surgical history. No Known Allergies    REVIEW OF SYSTEMS:  General: He denies any chills, fever, weight loss or gain, appetite or sleeping habits. Eyes: No blurred or visual changes  Respiratory: Negative for - acute cough, hemoptysis, shortness of breath, or wheezing  Cardiovascular : Negative for - acute chest pain, orthopnea, edema, palpitations, or shortness of breath  Musculoskeletal: Negative for myalgias or joint pain. Neurological: Negative for - TIA or stroke symptoms  Hematologic: Denies unexplained bruises or bleeding  Endocrine: Denies malaise/lethargy, hot/cold intolerance, polyuria/polydipsia, or unexpected weight changes.     Social History     Socioeconomic History    Marital status:      Spouse name: Not on file    Number of children: Not on file    Years of education: Not on file    Highest education level: Not on file   Tobacco Use    Smoking status: Current Every Day Smoker    Smokeless tobacco: Current User   Substance and Sexual Activity    Alcohol use: Yes     Family History   Problem Relation Age of Onset    Cancer Father     Heart Disease Father        OBJECTIVE:     Visit Vitals  BP (!) 133/92 (BP 1 Location: Left upper arm, BP Patient Position: Sitting, BP Cuff Size: Large adult)   Pulse 82   Temp 98.4 °F (36.9 °C)   Resp 16   Ht 5' 9\" (1.753 m)   Wt 302 lb 9.6 oz (137.3 kg)   SpO2 96% BMI 44.69 kg/m²       Constitutional: He appears well nourished, of stated age, and dressed appropriately. Eyes: Sclera anicteric, PERRLA, EOMI  Neck: Supple without lymphadenopathy. Respiratory: Slight wheezing to right side on ascultation; left is clear. Normal inspiratory effort. Cardiovascular: Regular rate and rhythm, no murmurs, rubs or gallops, PMI not displaced, No thrills, no peripheral edema  Lymphatic: No lymph node enlargemant. Neuro: Non-focal exam, A & O X 3.  Station and gait normal.  Psychiatric: Appropriate affect and demeanor, pleasant and cooperative. Patient's thought content and thought processing appear to be within normal limits. ASSESSMENT/PLAN:       ICD-10-CM ICD-9-CM    1. Essential hypertension  I10 401.9 metoprolol succinate (TOPROL-XL) 100 mg tablet   2. Morbid obesity ([facility] [address]  E66.01 278.01    3. Tobacco abuse  Z72.0 305.1    4. Tobacco abuse counseling  Z71.6 V65.42      305.1    5. Wheezing  R06.2 786.07    6. Hypercholesterolemia  E78.00 272.0      1: Blood pressure mildly improved, but will require further adjustment. Patient to increase Toprol-XL to 100 mg daily. Prescription sent to pharmacy. 2: Patient to monitor blood pressures twice daily and record results. Bring to next appointment. 3: Patient to start 81 mg ASA EC daily. Stay well-hydrated. 4: Patient to continue healthy lifestyle management including: Low-fat/low-cholesterol diet, caloric reduction, regular exercise, focused on weight loss. 5: Patient to follow-up with me in approximately 3 months, or sooner as needed. Patient states understanding and agrees with plan. Orders Placed This Encounter    metoprolol succinate (TOPROL-XL) 100 mg tablet         ATTENTION:   This medical record was transcribed using an electronic medical records system. Although proofread, it may and can contain electronic and spelling errors. Other human spelling and other errors may be present.   Corrections may be executed at a later time. Please feel free to contact us for any clarifications as needed. Signed,  Diane Stewart.  Dearl Chin, MSN APRN FNP-BC

## 2021-07-30 ENCOUNTER — OFFICE VISIT (OUTPATIENT)
Dept: INTERNAL MEDICINE CLINIC | Age: 41
End: 2021-07-30
Payer: COMMERCIAL

## 2021-07-30 VITALS
OXYGEN SATURATION: 97 % | RESPIRATION RATE: 16 BRPM | TEMPERATURE: 99 F | WEIGHT: 315 LBS | BODY MASS INDEX: 46.65 KG/M2 | HEART RATE: 87 BPM | HEIGHT: 69 IN | DIASTOLIC BLOOD PRESSURE: 88 MMHG | SYSTOLIC BLOOD PRESSURE: 130 MMHG

## 2021-07-30 DIAGNOSIS — Z72.0 TOBACCO ABUSE: ICD-10-CM

## 2021-07-30 DIAGNOSIS — E66.01 MORBID OBESITY (HCC): ICD-10-CM

## 2021-07-30 DIAGNOSIS — G47.33 OSA (OBSTRUCTIVE SLEEP APNEA): ICD-10-CM

## 2021-07-30 DIAGNOSIS — Z71.3 DIETARY COUNSELING: ICD-10-CM

## 2021-07-30 DIAGNOSIS — Z71.6 TOBACCO ABUSE COUNSELING: ICD-10-CM

## 2021-07-30 DIAGNOSIS — I10 ESSENTIAL HYPERTENSION: Primary | ICD-10-CM

## 2021-07-30 DIAGNOSIS — D75.1 POLYCYTHEMIA SECONDARY TO SMOKING: ICD-10-CM

## 2021-07-30 DIAGNOSIS — Z79.899 ON STATIN THERAPY: ICD-10-CM

## 2021-07-30 DIAGNOSIS — E78.00 HYPERCHOLESTEROLEMIA: ICD-10-CM

## 2021-07-30 DIAGNOSIS — R06.2 WHEEZING: ICD-10-CM

## 2021-07-30 PROCEDURE — 99214 OFFICE O/P EST MOD 30 MIN: CPT | Performed by: NURSE PRACTITIONER

## 2021-07-30 NOTE — PROGRESS NOTES
Chief Complaint   Patient presents with    Blood Pressure Check     3 month follow up       SUBJECTIVE:    Marcos Ferro is a 42 y.o. male who is here today for a follow up appointment regarding his hypertension, hyper cholesterolemia, LAURA with CPAP, morbid obesity, and tobacco abuse. Patient states he is taking his blood pressure medication as prescribed each day. He is tolerating it well, and denies any adverse side effects. He states he has not been checking his blood pressures at all. He continues to take his atorvastatin for treatment of his cholesterol. He has gained approximately 13 pounds since our last encounter together. He is using his CPAP due to his obstructive sleep apnea. He continues to smoke despite health concerns. Current Outpatient Medications   Medication Sig Dispense Refill    metoprolol succinate (TOPROL-XL) 100 mg tablet Take 1 Tab by mouth daily. Indications: high blood pressure 90 Tab 1    atorvastatin (LIPITOR) 10 mg tablet Take 1 Tab by mouth daily. Indications: high cholesterol 30 Tab 5    albuterol (PROVENTIL HFA, VENTOLIN HFA, PROAIR HFA) 90 mcg/actuation inhaler Take 1 Puff by inhalation every four (4) hours as needed for Wheezing. Indications: WHEEZING 1 Inhaler 5     Past Medical History:   Diagnosis Date    Cigarette nicotine dependence without complication 43/40/6660    Essential hypertension 10/9/2017    Panic anxiety syndrome 10/9/2017    Recurrent kidney stones 10/9/2017     History reviewed. No pertinent surgical history.   No Known Allergies    REVIEW OF SYSTEMS:                                        POSITIVE= bold text  Negative = regular text    General:                     fever, chills, sweats, generalized weakness, weight loss/gain,                                       loss of appetite   Eyes:                           blurred vision, eye pain, loss of vision, double vision  ENT:                            rhinorrhea, pharyngitis   Respiratory: cough, sputum production, SOB, BOLES, wheezing, pleuritic pain   Cardiology:                chest pain, palpitations, orthopnea, PND, edema, syncope   Gastrointestinal:       abdominal pain , N/V, diarrhea, dysphagia, constipation, bleeding   Genitourinary:           frequency, urgency, dysuria, hematuria, incontinence   Muskuloskeletal :      arthralgia, myalgia, back pain  Hematology:              easy bruising, nose or gum bleeding, lymphadenopathy   Dermatological:         rash, ulceration, pruritis, color change / jaundice  Endocrine:                 hot flashes or polydipsia   Neurological:             headache, dizziness, confusion, focal weakness, paresthesia,                                      Speech difficulties, memory loss, gait difficulty  Psychological:          Feelings of anxiety, depression, agitation        Social History     Socioeconomic History    Marital status:      Spouse name: Not on file    Number of children: Not on file    Years of education: Not on file    Highest education level: Not on file   Tobacco Use    Smoking status: Current Every Day Smoker     Packs/day: 0.50     Years: 20.00     Pack years: 10.00     Types: Cigarettes    Smokeless tobacco: Former User     Types: Snuff     Quit date: 2019   Substance and Sexual Activity    Alcohol use: Yes     Comment: socially     Social Determinants of Health     Financial Resource Strain:     Difficulty of Paying Living Expenses:    Food Insecurity:     Worried About Running Out of Food in the Last Year:     [address] in the Last Year:    Transportation Needs:     Lack of Transportation (Medical):      Lack of Transportation (Non-Medical):    Physical Activity:     Days of Exercise per Week:     Minutes of Exercise per Session:    Stress:     Feeling of Stress :    Social Connections:     Frequency of Communication with Friends and Family:     Frequency of Social Gatherings with Friends and Family:  Attends [employer] Services:     Active Member of Clubs or Organizations:     Attends Club or Organization Meetings:     Marital Status:      Family History   Problem Relation Age of Onset    Cancer Father     Heart Disease Father        OBJECTIVE:     Visit Vitals  /88 (BP 1 Location: Left upper arm, BP Patient Position: Sitting, BP Cuff Size: Large adult)   Pulse 87   Temp 99 °F (37.2 °C)   Resp 16   Ht 5' 9\" (1.753 m)   Wt 315 lb 3.2 oz (143 kg)   SpO2 97%   BMI 46.55 kg/m²       Constitutional: He appears well nourished, morbidly obese, of stated age, and dressed appropriately. Eyes: Sclera anicteric, PERRLA, EOMI  Neck: Supple without lymphadenopathy. Thyroid normal, No JVD or bruits  Respiratory: Wheezing to ascultation X5, normal inspiratory effort. Cardiovascular: Regular rate and rhythm, no murmurs, rubs or gallops, PMI not displaced, No thrills, no peripheral edema  Neuro: Non-focal exam, A & O X 3.  Psychiatric: Appropriate affect and demeanor, pleasant and cooperative. Patient's thought content and thought processing appear to be within normal limits. ASSESSMENT/PLAN:     ICD-10-CM ICD-9-CM    1. Essential hypertension  I10 401.9    2. Morbid obesity ([address]  E66.01 278.01    3. Tobacco abuse  Z72.0 305.1    4. Tobacco abuse counseling  Z71.6 V65.42      305.1    5. Wheezing  R06.2 786.07    6. Hypercholesterolemia  E78.00 272.0 LIPID PANEL   7. LAURA (obstructive sleep apnea)  G47.33 327.23    8. Dietary counseling  Z71.3 V65.3    9. 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 On statin therapy  Z79.899 V58.69 HEPATIC FUNCTION PANEL      LIPID PANEL     1: Patient to continue metoprolol succinate for management of hypertension. 2: Patient encouraged to discontinue smoking due to adverse side effects including wheezing and polycythemia.  3: Patient will return for fasting labs to do: LFTs, CBC, and lipid panel.   4: Patient to continue atorvastatin daily for management of cholesterol. 5: Patient was counseled and educated thoroughly on appropriate dietary management. 6: Patient to follow-up with me in approximately 3 months, or sooner as needed. Patient states understanding and agrees with plan. Orders Placed This Encounter    CBC W/O DIFF    HEPATIC FUNCTION PANEL    LIPID PANEL         ATTENTION:   This medical record was transcribed using an electronic medical records system. Although proofread, it may and can contain electronic and spelling errors. Other human spelling and other errors may be present. Corrections may be executed at a later time. Please feel free to contact us for any clarifications as needed. Follow-up and Dispositions    · Return in about 3 months (around 10/30/2021) for Follow up. Signed,  John Farr.  Maco Washington, MSN APRN FNP-BC

## 2021-07-30 NOTE — PROGRESS NOTES
Ck Johnson is a 42 y.o. male    Chief Complaint   Patient presents with    Blood Pressure Check     3 month follow up       Visit Vitals  /88 (BP 1 Location: Left upper arm, BP Patient Position: Sitting, BP Cuff Size: Large adult)   Pulse 87   Temp 99 °F (37.2 °C)   Resp 16   Ht 5' 9\" (1.753 m)   Wt 315 lb 3.2 oz (143 kg)   SpO2 97%   BMI 46.55 kg/m²           1. Have you been to the ER, urgent care clinic since your last visit? Hospitalized since your last visit? No     2. Have you seen or consulted any other health care providers outside of the [address] [place] [address] since your last visit? Include any pap smears or colon screening.  No

## 2021-08-12 ENCOUNTER — APPOINTMENT (OUTPATIENT)
Dept: INTERNAL MEDICINE CLINIC | Age: 41
End: 2021-08-12

## 2021-08-12 DIAGNOSIS — D75.1 POLYCYTHEMIA SECONDARY TO SMOKING: ICD-10-CM

## 2021-08-12 DIAGNOSIS — Z79.899 ON STATIN THERAPY: ICD-10-CM

## 2021-08-12 DIAGNOSIS — E78.00 HYPERCHOLESTEROLEMIA: ICD-10-CM

## 2021-08-13 LAB
ALBUMIN SERPL-MCNC: 4.3 G/DL (ref 4–5)
ALP SERPL-CCNC: 80 IU/L (ref 48–121)
ALT SERPL-CCNC: 60 IU/L (ref 0–44)
AST SERPL-CCNC: 29 IU/L (ref 0–40)
BILIRUB DIRECT SERPL-MCNC: 0.08 MG/DL (ref 0–0.4)
BILIRUB SERPL-MCNC: 0.2 MG/DL (ref 0–1.2)
CHOLEST SERPL-MCNC: 173 MG/DL (ref 100–199)
ERYTHROCYTE [DISTWIDTH] IN BLOOD BY AUTOMATED COUNT: 12.7 % (ref 11.6–15.4)
HCT VFR BLD AUTO: 48 % (ref 37.5–51)
HDLC SERPL-MCNC: 40 MG/DL
HGB BLD-MCNC: 16 G/DL (ref 13–17.7)
LDLC SERPL CALC-MCNC: 100 MG/DL (ref 0–99)
MCH RBC QN AUTO: 30.9 PG (ref 26.6–33)
MCHC RBC AUTO-ENTMCNC: 33.3 G/DL (ref 31.5–35.7)
MCV RBC AUTO: 93 FL (ref 79–97)
PLATELET # BLD AUTO: 293 X10E3/UL (ref 150–450)
PROT SERPL-MCNC: 7 G/DL (ref 6–8.5)
RBC # BLD AUTO: 5.18 X10E6/UL (ref 4.14–5.8)
TRIGL SERPL-MCNC: 189 MG/DL (ref 0–149)
VLDLC SERPL CALC-MCNC: 33 MG/DL (ref 5–40)
WBC # BLD AUTO: 13.1 X10E3/UL (ref 3.4–10.8)

## 2021-08-13 NOTE — PROGRESS NOTES
White cell count still slightly elevated above normal.  We will continue to watch this. One of your liver enzymes is a little elevated, and likely due to fatty liver disease. Cholesterol levels are much better now. Continue atorvastatin 10 mg nightly. Recheck labs in 6 months.

## 2021-11-29 ENCOUNTER — LAB ONLY (OUTPATIENT)
Dept: INTERNAL MEDICINE CLINIC | Age: 41
End: 2021-11-29

## 2021-11-29 DIAGNOSIS — R79.89 ELEVATED LFTS: ICD-10-CM

## 2021-11-29 DIAGNOSIS — D72.829 LEUKOCYTOSIS, UNSPECIFIED TYPE: ICD-10-CM

## 2021-11-29 DIAGNOSIS — E78.00 HYPERCHOLESTEROLEMIA: Primary | ICD-10-CM

## 2021-11-30 LAB
ALBUMIN SERPL-MCNC: 4.6 G/DL (ref 4–5)
ALBUMIN/GLOB SERPL: 1.5 {RATIO} (ref 1.2–2.2)
ALP SERPL-CCNC: 99 IU/L (ref 44–121)
ALT SERPL-CCNC: 58 IU/L (ref 0–44)
AST SERPL-CCNC: 32 IU/L (ref 0–40)
BASOPHILS # BLD AUTO: 0.1 X10E3/UL (ref 0–0.2)
BASOPHILS NFR BLD AUTO: 1 %
BILIRUB SERPL-MCNC: <0.2 MG/DL (ref 0–1.2)
BUN SERPL-MCNC: 10 MG/DL (ref 6–24)
BUN/CREAT SERPL: 10 (ref 9–20)
CALCIUM SERPL-MCNC: 9.6 MG/DL (ref 8.7–10.2)
CHLORIDE SERPL-SCNC: 100 MMOL/L (ref 96–106)
CHOLEST SERPL-MCNC: 193 MG/DL (ref 100–199)
CO2 SERPL-SCNC: 26 MMOL/L (ref 20–29)
CREAT SERPL-MCNC: 0.97 MG/DL (ref 0.76–1.27)
EOSINOPHIL # BLD AUTO: 0.3 X10E3/UL (ref 0–0.4)
EOSINOPHIL NFR BLD AUTO: 2 %
ERYTHROCYTE [DISTWIDTH] IN BLOOD BY AUTOMATED COUNT: 12.8 % (ref 11.6–15.4)
GLOBULIN SER CALC-MCNC: 3 G/DL (ref 1.5–4.5)
GLUCOSE SERPL-MCNC: 93 MG/DL (ref 65–99)
HCT VFR BLD AUTO: 48.7 % (ref 37.5–51)
HDLC SERPL-MCNC: 46 MG/DL
HGB BLD-MCNC: 16.5 G/DL (ref 13–17.7)
IMM GRANULOCYTES # BLD AUTO: 0.1 X10E3/UL (ref 0–0.1)
IMM GRANULOCYTES NFR BLD AUTO: 1 %
LDLC SERPL CALC-MCNC: 115 MG/DL (ref 0–99)
LYMPHOCYTES # BLD AUTO: 3.3 X10E3/UL (ref 0.7–3.1)
LYMPHOCYTES NFR BLD AUTO: 26 %
MCH RBC QN AUTO: 30.7 PG (ref 26.6–33)
MCHC RBC AUTO-ENTMCNC: 33.9 G/DL (ref 31.5–35.7)
MCV RBC AUTO: 91 FL (ref 79–97)
MONOCYTES # BLD AUTO: 0.9 X10E3/UL (ref 0.1–0.9)
MONOCYTES NFR BLD AUTO: 7 %
NEUTROPHILS # BLD AUTO: 8 X10E3/UL (ref 1.4–7)
NEUTROPHILS NFR BLD AUTO: 63 %
PLATELET # BLD AUTO: 327 X10E3/UL (ref 150–450)
POTASSIUM SERPL-SCNC: 5.1 MMOL/L (ref 3.5–5.2)
PROT SERPL-MCNC: 7.6 G/DL (ref 6–8.5)
RBC # BLD AUTO: 5.37 X10E6/UL (ref 4.14–5.8)
SODIUM SERPL-SCNC: 139 MMOL/L (ref 134–144)
TRIGL SERPL-MCNC: 182 MG/DL (ref 0–149)
VLDLC SERPL CALC-MCNC: 32 MG/DL (ref 5–40)
WBC # BLD AUTO: 12.6 X10E3/UL (ref 3.4–10.8)

## 2021-11-30 NOTE — PROGRESS NOTES
Cholesterol levels are somewhat elevated. Continue atorvastatin 10 mg nightly. Kidney function, liver functions, and electrolytes are stable. Blood count shows no signs of anemia.

## 2022-01-13 ENCOUNTER — OFFICE VISIT (OUTPATIENT)
Dept: INTERNAL MEDICINE CLINIC | Age: 42
End: 2022-01-13
Payer: COMMERCIAL

## 2022-01-13 VITALS
OXYGEN SATURATION: 98 % | BODY MASS INDEX: 46.65 KG/M2 | HEART RATE: 82 BPM | SYSTOLIC BLOOD PRESSURE: 152 MMHG | RESPIRATION RATE: 16 BRPM | TEMPERATURE: 97.4 F | HEIGHT: 69 IN | DIASTOLIC BLOOD PRESSURE: 92 MMHG | WEIGHT: 315 LBS

## 2022-01-13 DIAGNOSIS — E78.00 HYPERCHOLESTEROLEMIA: ICD-10-CM

## 2022-01-13 DIAGNOSIS — Z72.0 TOBACCO ABUSE: ICD-10-CM

## 2022-01-13 DIAGNOSIS — Z71.6 TOBACCO ABUSE COUNSELING: ICD-10-CM

## 2022-01-13 DIAGNOSIS — I10 ESSENTIAL HYPERTENSION: Primary | ICD-10-CM

## 2022-01-13 DIAGNOSIS — E66.01 MORBID OBESITY (HCC): ICD-10-CM

## 2022-01-13 PROCEDURE — 99214 OFFICE O/P EST MOD 30 MIN: CPT | Performed by: NURSE PRACTITIONER

## 2022-01-13 RX ORDER — GUAIFENESIN 100 MG/5ML
81 LIQUID (ML) ORAL DAILY
COMMUNITY

## 2022-01-13 RX ORDER — VALSARTAN 80 MG/1
80 TABLET ORAL DAILY
Qty: 90 TABLET | Refills: 1 | Status: SHIPPED | OUTPATIENT
Start: 2022-01-13 | End: 2022-03-15 | Stop reason: DRUGHIGH

## 2022-01-13 NOTE — PROGRESS NOTES
Chief Complaint   Patient presents with    Follow-up     fasting lab       SUBJECTIVE:    Warren Carl is a 42 y.o. male who is here today for a follow up appointment regarding his hypertension, hypercholesterolemia, morbid obesity, and continued tobacco abuse. The patient was previously prescribed metoprolol succinate 100 mg to be taken daily for management of his blood pressure and elevated heart rate. He has been taking medication as prescribed, but has noticed that his blood pressure has progressively increased over the past several weeks. He denies any adverse side effects of the medication. He denies any chest pain, chest pressure, shortness of breath, headaches, dizziness, blurred vision, palpitations, or syncope episodes. His heart rate is down to a more normal level now. The patient is also taking medication for management of his cholesterol. He is using atorvastatin 10 mg nightly which she states he is tolerating well. He denies any joint pain or myalgias. The patient continues to smoke despite health risks. He has been previously counseled on cessation. Current Outpatient Medications   Medication Sig Dispense Refill    aspirin 81 mg chewable tablet Take 81 mg by mouth daily.  valsartan (DIOVAN) 80 mg tablet Take 1 Tablet by mouth daily. Indications: high blood pressure 90 Tablet 1    atorvastatin (LIPITOR) 10 mg tablet TAKE 1 TAB BY MOUTH DAILY. INDICATIONS: HIGH CHOLESTEROL 30 Tablet 2    metoprolol succinate (TOPROL-XL) 100 mg tablet TAKE 1 TABLET BY MOUTH EVERY DAY 90 Tablet 0    albuterol (PROVENTIL HFA, VENTOLIN HFA, PROAIR HFA) 90 mcg/actuation inhaler Take 1 Puff by inhalation every four (4) hours as needed for Wheezing. Indications:  WHEEZING (Patient not taking: Reported on 1/13/2022) 1 Inhaler 5     Past Medical History:   Diagnosis Date    Cigarette nicotine dependence without complication 47/21/3945    Essential hypertension 10/9/2017    Panic anxiety syndrome 10/9/2017    Recurrent kidney stones 10/9/2017     History reviewed. No pertinent surgical history.   No Known Allergies    REVIEW OF SYSTEMS:                                        POSITIVE= bold text  Negative = regular text    General:                     fever, chills, sweats, generalized weakness, weight loss/gain,                                       loss of appetite   Eyes:                           blurred vision, eye pain, loss of vision, double vision  ENT:                            rhinorrhea, pharyngitis   Respiratory:               cough, sputum production, SOB, BOLES, wheezing, pleuritic pain   Cardiology:                chest pain, palpitations, orthopnea, PND, edema, syncope   Gastrointestinal:       abdominal pain , N/V, diarrhea, dysphagia, constipation, bleeding   Genitourinary:           frequency, urgency, dysuria, hematuria, incontinence   Muskuloskeletal :      arthralgia, myalgia, back pain  Hematology:              easy bruising, nose or gum bleeding, lymphadenopathy   Dermatological:         rash, ulceration, pruritis, color change / jaundice  Endocrine:                 hot flashes or polydipsia   Neurological:             headache, dizziness, confusion, focal weakness, paresthesia,                                      Speech difficulties, memory loss, gait difficulty  Psychological:          Feelings of anxiety, depression, agitation        Social History     Socioeconomic History    Marital status:    Tobacco Use    Smoking status: Current Every Day Smoker     Packs/day: 0.50     Years: 20.00     Pack years: 10.00     Types: Cigarettes    Smokeless tobacco: Former User     Types: Snuff     Quit date: 2019   Substance and Sexual Activity    Alcohol use: Yes     Comment: socially     Family History   Problem Relation Age of Onset    Cancer Father     Heart Disease Father        OBJECTIVE:     Visit Vitals  BP (!) 158/98 (BP 1 Location: Left upper arm, BP Patient Position: Sitting, BP Cuff Size: Large adult)   Pulse 82   Temp 97.4 °F (36.3 °C)   Resp 16   Ht 5' 9\" (1.753 m)   Wt 320 lb 9.6 oz (145.4 kg)   SpO2 98%   BMI 47.34 kg/m²       Constitutional: He appears well nourished, of stated age, and dressed appropriately. Eyes: Sclera anicteric, PERRLA, EOMI  Neck: Supple without lymphadenopathy. Thyroid normal, No JVD or bruits  Respiratory: Clear to ascultation X5, normal inspiratory effort, no adventitious breath sounds. Cardiovascular: Regular rate and rhythm, no murmurs, rubs or gallops, PMI not displaced, No thrills, no peripheral edema  Neuro: Non-focal exam, A & O X 3.  Psychiatric: Appropriate affect and demeanor, pleasant and cooperative. Patient's thought content and thought processing appear to be within normal limits. ASSESSMENT/PLAN:     ICD-10-CM ICD-9-CM    1. Essential hypertension  I10 401.9 valsartan (DIOVAN) 80 mg tablet   2. Hypercholesterolemia  E78.00 272.0    3. Morbid obesity ([facility] [address] [address]  E66.01 278.01    4. Tobacco abuse  Z72.0 305.1    5. Tobacco abuse counseling  Z71.6 V65.42      305.1      1: Patient to continue metoprolol succinate 100 mg nightly for management of hypertension. I will add Diovan 80 mg to take each morning. Patient to check blood pressures often and record results. 2: Patient to continue atorvastatin 10 mg nightly for management of cholesterol. 3: Patient to work on tobacco cessation due to health risks. 4: Patient to work on healthy lifestyle management including: Low-fat/low-cholesterol diet, adequate amounts of fiber and water, and regular exercise patterns especially walking. 5: Patient to follow-up with me in approximately 2 months for recheck and fasting labs. Patient states understanding and agrees with plan. Orders Placed This Encounter    aspirin 81 mg chewable tablet    valsartan (DIOVAN) 80 mg tablet         ATTENTION:   This medical record was transcribed using an electronic medical records system. Although proofread, it may and can contain electronic and spelling errors. Other human spelling and other errors may be present. Corrections may be executed at a later time. Please feel free to contact us for any clarifications as needed. Signed,  Matt De Leon.  Mike Mendoza, MSN APRN FNP-BC

## 2022-01-13 NOTE — PROGRESS NOTES
Darshana Ayala is a 42 y.o. male    Chief Complaint   Patient presents with    Follow-up     fasting lab       Visit Vitals  BP (!) 158/98 (BP 1 Location: Left upper arm, BP Patient Position: Sitting, BP Cuff Size: Large adult)   Pulse 82   Temp 97.4 °F (36.3 °C)   Resp 16   Ht 5' 9\" (1.753 m)   Wt 320 lb 9.6 oz (145.4 kg)   SpO2 98%   BMI 47.34 kg/m²           1. Have you been to the ER, urgent care clinic since your last visit? Hospitalized since your last visit? NO    2. Have you seen or consulted any other health care providers outside of the [address] [place] [address] since your last visit? Include any pap smears or colon screening.  NO

## 2022-01-26 DIAGNOSIS — I10 ESSENTIAL HYPERTENSION: ICD-10-CM

## 2022-01-26 RX ORDER — METOPROLOL SUCCINATE 100 MG/1
TABLET, EXTENDED RELEASE ORAL
Qty: 90 TABLET | Refills: 0 | Status: SHIPPED | OUTPATIENT
Start: 2022-01-26 | End: 2022-03-15 | Stop reason: DRUGHIGH

## 2022-03-15 ENCOUNTER — OFFICE VISIT (OUTPATIENT)
Dept: INTERNAL MEDICINE CLINIC | Age: 42
End: 2022-03-15
Payer: COMMERCIAL

## 2022-03-15 VITALS
HEART RATE: 91 BPM | SYSTOLIC BLOOD PRESSURE: 138 MMHG | HEIGHT: 69 IN | WEIGHT: 315 LBS | RESPIRATION RATE: 16 BRPM | TEMPERATURE: 98.7 F | BODY MASS INDEX: 46.65 KG/M2 | DIASTOLIC BLOOD PRESSURE: 88 MMHG | OXYGEN SATURATION: 97 %

## 2022-03-15 DIAGNOSIS — I10 ESSENTIAL HYPERTENSION: Primary | ICD-10-CM

## 2022-03-15 DIAGNOSIS — R79.89 ELEVATED LFTS: ICD-10-CM

## 2022-03-15 DIAGNOSIS — Z79.899 ON STATIN THERAPY: ICD-10-CM

## 2022-03-15 DIAGNOSIS — E78.00 HYPERCHOLESTEROLEMIA: ICD-10-CM

## 2022-03-15 DIAGNOSIS — E66.01 MORBID OBESITY (HCC): ICD-10-CM

## 2022-03-15 PROCEDURE — 99214 OFFICE O/P EST MOD 30 MIN: CPT | Performed by: NURSE PRACTITIONER

## 2022-03-15 RX ORDER — VALSARTAN 160 MG/1
160 TABLET ORAL DAILY
Qty: 90 TABLET | Refills: 1 | Status: SHIPPED | OUTPATIENT
Start: 2022-03-15 | End: 2022-09-20

## 2022-03-15 RX ORDER — METOPROLOL SUCCINATE 50 MG/1
50 TABLET, EXTENDED RELEASE ORAL DAILY
Qty: 90 TABLET | Refills: 1 | Status: SHIPPED | OUTPATIENT
Start: 2022-03-15 | End: 2022-09-20

## 2022-03-15 NOTE — PROGRESS NOTES
Chief Complaint   Patient presents with    Follow-up     2 month follow up       SUBJECTIVE:    Annabel Paredes is a 42 y.o. male who is here today for a follow up appointment regarding his hypertension, hypercholesterolemia, and morbid obesity. He denies any new or acute complaints at this time. He states he is feeling fairly well overall. Patient continues to take metoprolol and valsartan for management of his hypertension. He states he is tolerating the medication well, and denies any adverse side effects. However, the patient continues to gain weight, and he is concerned about the possibility of his medication contributing to this. He admits he is not always watchful of his diet though. He denies any chest pain, chest pressure, shortness of breath, headaches, dizziness, blurred vision, palpitations, or syncope episodes. His blood pressure appears better controlled now with the addition of valsartan. Additionally, the patient is being managed for hypercholesterolemia. He has been taking atorvastatin 10 mg nightly for this. He states he is tolerating the medication well without adverse side effect. Current Outpatient Medications   Medication Sig Dispense Refill    metoprolol succinate (TOPROL-XL) 50 mg XL tablet Take 1 Tablet by mouth daily. 90 Tablet 1    valsartan (DIOVAN) 160 mg tablet Take 1 Tablet by mouth daily. Indications: high blood pressure 90 Tablet 1    atorvastatin (LIPITOR) 10 mg tablet Take 1 Tablet by mouth daily. Indications: high cholesterol 90 Tablet 0    aspirin 81 mg chewable tablet Take 81 mg by mouth daily.  albuterol (PROVENTIL HFA, VENTOLIN HFA, PROAIR HFA) 90 mcg/actuation inhaler Take 1 Puff by inhalation every four (4) hours as needed for Wheezing. Indications:  WHEEZING (Patient not taking: Reported on 1/13/2022) 1 Inhaler 5     Past Medical History:   Diagnosis Date    Cigarette nicotine dependence without complication 45/50/5386    Essential hypertension 10/9/2017    Panic anxiety syndrome 10/9/2017    Recurrent kidney stones 10/9/2017     History reviewed. No pertinent surgical history.   No Known Allergies    REVIEW OF SYSTEMS:                                        POSITIVE= bold text  Negative = regular text    General:                     fever, chills, sweats, generalized weakness, weight loss/gain,                                       loss of appetite   Eyes:                           blurred vision, eye pain, loss of vision, double vision  ENT:                            rhinorrhea, pharyngitis   Respiratory:               cough, sputum production, SOB, BOLES, wheezing, pleuritic pain   Cardiology:                chest pain, palpitations, orthopnea, PND, edema, syncope   Gastrointestinal:       abdominal pain , N/V, diarrhea, dysphagia, constipation, bleeding   Genitourinary:           frequency, urgency, dysuria, hematuria, incontinence   Muskuloskeletal :      arthralgia, myalgia, back pain  Hematology:              easy bruising, nose or gum bleeding, lymphadenopathy   Dermatological:         rash, ulceration, pruritis, color change / jaundice  Endocrine:                 hot flashes or polydipsia   Neurological:             headache, dizziness, confusion, focal weakness, paresthesia,                                      Speech difficulties, memory loss, gait difficulty  Psychological:          Feelings of anxiety, depression, agitation        Social History     Socioeconomic History    Marital status:    Tobacco Use    Smoking status: Current Every Day Smoker     Packs/day: 0.50     Years: 20.00     Pack years: 10.00     Types: Cigarettes    Smokeless tobacco: Former User     Types: Snuff     Quit date: 2019   Vaping Use    Vaping Use: Never used   Substance and Sexual Activity    Alcohol use: Yes     Comment: socially     Family History   Problem Relation Age of Onset    Cancer Father     Heart Disease Father        OBJECTIVE:     Visit Vitals  /88 (BP 1 Location: Left upper arm, BP Patient Position: Sitting, BP Cuff Size: Large adult)   Pulse 91   Temp 98.7 °F (37.1 °C)   Resp 16   Ht 5' 9\" (1.753 m)   Wt 323 lb 12.8 oz (146.9 kg)   SpO2 97%   BMI 47.82 kg/m²       Constitutional: He appears well nourished, of stated age, and dressed appropriately. Eyes: Sclera anicteric, PERRLA, EOMI  Neck: Supple without lymphadenopathy. Thyroid normal, No JVD or bruits  Respiratory: Clear to ascultation X5, normal inspiratory effort, no adventitious breath sounds. Cardiovascular: Regular rate and rhythm, no murmurs, rubs or gallops, PMI not displaced, No thrills, no peripheral edema  Neuro: Non-focal exam, A & O X 3.  Psychiatric: Appropriate affect and demeanor, pleasant and cooperative. Patient's thought content and thought processing appear to be within normal limits. ASSESSMENT/PLAN:     ICD-10-CM ICD-9-CM    1. Essential hypertension  I10 401.9 metoprolol succinate (TOPROL-XL) 50 mg XL tablet      valsartan (DIOVAN) 160 mg tablet   2. Hypercholesterolemia  E78.00 272.0 LIPID PANEL      LIPID PANEL   3. Morbid obesity ([facility])  E66.01 278.01    4. Elevated LFTs  R79.89 790.6 HEPATIC FUNCTION PANEL      HEPATIC FUNCTION PANEL   5. On statin therapy  Z79.899 V58.69 LIPID PANEL      HEPATIC FUNCTION PANEL      HEPATIC FUNCTION PANEL      LIPID PANEL     1: We will repeat labs today including: LFTs and lipid panel. 2: I will reduce patient's metoprolol to 50 mg daily and increase his valsartan to 160 mg daily. Continue to monitor blood pressures and heart rate closely. 3: Patient to work on healthy lifestyle management including: Low-fat/low-cholesterol diet, avoidance of concentrated sweets and excess carbohydrates, avoidance of adding salt, regular exercise patterns such as walking, and plenty of vegetables that are rich in fiber in color. 4: Patient to follow-up with me in approximately 6 months, or sooner as needed.   Patient states understanding and agrees with plan. Orders Placed This Encounter    LIPID PANEL    HEPATIC FUNCTION PANEL    metoprolol succinate (TOPROL-XL) 50 mg XL tablet    valsartan (DIOVAN) 160 mg tablet         ATTENTION:   This medical record was transcribed using an electronic medical records system. Although proofread, it may and can contain electronic and spelling errors. Other human spelling and other errors may be present. Corrections may be executed at a later time. Please feel free to contact us for any clarifications as needed. Follow-up and Dispositions    · Return in about 6 months (around 9/15/2022) for Follow up with fasting labs. Signed,  Dk Ferrer.  Afua Hope, MSN APRN FNP-BC

## 2022-03-15 NOTE — PROGRESS NOTES
Namrata Carrera is a 42 y.o. male    Chief Complaint   Patient presents with    Follow-up     2 month follow up       Visit Vitals  /88 (BP 1 Location: Left upper arm, BP Patient Position: Sitting, BP Cuff Size: Large adult)   Pulse 91   Temp 98.7 °F (37.1 °C)   Resp 16   Ht 5' 9\" (1.753 m)   Wt 323 lb 12.8 oz (146.9 kg)   SpO2 97%   BMI 47.82 kg/m²           1. Have you been to the ER, urgent care clinic since your last visit? Hospitalized since your last visit? NO    2. Have you seen or consulted any other health care providers outside of the [address] [place] [address] since your last visit? Include any pap smears or colon screening.  NO

## 2022-03-16 LAB
ALBUMIN SERPL-MCNC: 4.7 G/DL (ref 4–5)
ALP SERPL-CCNC: 83 IU/L (ref 44–121)
ALT SERPL-CCNC: 79 IU/L (ref 0–44)
AST SERPL-CCNC: 47 IU/L (ref 0–40)
BILIRUB DIRECT SERPL-MCNC: <0.1 MG/DL (ref 0–0.4)
BILIRUB SERPL-MCNC: 0.2 MG/DL (ref 0–1.2)
CHOLEST SERPL-MCNC: 227 MG/DL (ref 100–199)
HDLC SERPL-MCNC: 48 MG/DL
LDLC SERPL CALC-MCNC: 145 MG/DL (ref 0–99)
PROT SERPL-MCNC: 7.7 G/DL (ref 6–8.5)
TRIGL SERPL-MCNC: 186 MG/DL (ref 0–149)
VLDLC SERPL CALC-MCNC: 34 MG/DL (ref 5–40)

## 2022-03-17 NOTE — PROGRESS NOTES
Liver enzymes are slightly elevated, and likely due to fatty liver disease. Cholesterol levels have actually worsened over the past several months, despite the use of atorvastatin. Please clarify if patient is taking atorvastatin or not. If he is, I need to increase the dose. Please let me know.

## 2022-03-18 PROBLEM — F41.0 PANIC ANXIETY SYNDROME: Status: ACTIVE | Noted: 2017-10-09

## 2022-03-18 PROBLEM — R40.0 DAYTIME SOMNOLENCE: Status: ACTIVE | Noted: 2021-04-06

## 2022-03-18 PROBLEM — G47.33 OSA (OBSTRUCTIVE SLEEP APNEA): Status: ACTIVE | Noted: 2021-04-06

## 2022-03-19 PROBLEM — I10 ESSENTIAL HYPERTENSION: Status: ACTIVE | Noted: 2017-10-09

## 2022-03-19 PROBLEM — F17.210 CIGARETTE NICOTINE DEPENDENCE WITHOUT COMPLICATION: Status: ACTIVE | Noted: 2017-10-18

## 2022-03-19 PROBLEM — R06.83 LOUD SNORING: Status: ACTIVE | Noted: 2021-04-06

## 2022-03-19 PROBLEM — Z72.0 TOBACCO ABUSE: Status: ACTIVE | Noted: 2021-04-06

## 2022-03-19 PROBLEM — R06.2 WHEEZING: Status: ACTIVE | Noted: 2021-04-06

## 2022-03-19 PROBLEM — R00.0 TACHYCARDIA: Status: ACTIVE | Noted: 2021-04-06

## 2022-03-19 PROBLEM — N20.0 RECURRENT KIDNEY STONES: Status: ACTIVE | Noted: 2017-10-09

## 2022-03-20 PROBLEM — E66.01 MORBID OBESITY (HCC): Status: ACTIVE | Noted: 2021-04-06

## 2022-03-24 PROBLEM — Z79.899 ON STATIN THERAPY: Status: ACTIVE | Noted: 2022-03-15

## 2022-03-24 PROBLEM — E78.00 HYPERCHOLESTEROLEMIA: Status: ACTIVE | Noted: 2022-03-15

## 2022-05-27 ENCOUNTER — TELEPHONE (OUTPATIENT)
Dept: INTERNAL MEDICINE CLINIC | Age: 42
End: 2022-05-27

## 2022-05-27 NOTE — TELEPHONE ENCOUNTER
Covid Positive  102 fever yesterday,  101.9 this morning,  Wants an antiviral rx     Has no respiratory symptoms save for a little bit of coughing.      Requesting nurse call back

## 2022-06-06 DIAGNOSIS — R06.2 WHEEZING: ICD-10-CM

## 2022-06-06 RX ORDER — ALBUTEROL SULFATE 90 UG/1
1 AEROSOL, METERED RESPIRATORY (INHALATION)
Qty: 1 EACH | Refills: 3 | Status: SHIPPED | OUTPATIENT
Start: 2022-06-06

## 2022-06-06 NOTE — TELEPHONE ENCOUNTER
PCP: Myrtle Alan NP    Last appt: 3/15/2022  Future Appointments   Date Time Provider Helena Lilly   9/22/2022  9:00 AM Myrtle Alan NP [facility] AMB       Requested Prescriptions     Pending Prescriptions Disp Refills    albuterol (PROVENTIL HFA, VENTOLIN HFA, PROAIR HFA) 90 mcg/actuation inhaler 1 Each 3     Sig: Take 1 Puff by inhalation every four (4) hours as needed for Wheezing. Indications:  WHEEZING       Prior labs and Blood pressures:  BP Readings from Last 3 Encounters:   03/15/22 138/88   01/13/22 (!) 152/92   07/30/21 130/88     Lab Results   Component Value Date/Time    Sodium 139 11/29/2021 12:00 AM    Potassium 5.1 11/29/2021 12:00 AM    Chloride 100 11/29/2021 12:00 AM    CO2 26 11/29/2021 12:00 AM    Anion gap 3 (L) 07/03/2009 12:20 PM    Glucose 93 11/29/2021 12:00 AM    BUN 10 11/29/2021 12:00 AM    Creatinine 0.97 11/29/2021 12:00 AM    BUN/Creatinine ratio 10 11/29/2021 12:00 AM    GFR est  11/29/2021 12:00 AM    GFR est non-AA 96 11/29/2021 12:00 AM    Calcium 9.6 11/29/2021 12:00 AM     No results found for: HBA1C, MVS3PCNU, YSB3FXEU, JOX1AOXW  Lab Results   Component Value Date/Time    Cholesterol, total 227 (H) 03/15/2022 12:00 AM    Cholesterol (POC) 222.0 (A) 10/18/2017 09:07 AM    HDL Cholesterol 48 03/15/2022 12:00 AM    HDL Cholesterol (POC) 54.0 10/18/2017 09:07 AM    LDL Cholesterol (POC) 117.4 10/18/2017 09:07 AM    LDL, calculated 145 (H) 03/15/2022 12:00 AM    VLDL, calculated 34 03/15/2022 12:00 AM    Triglyceride 186 (H) 03/15/2022 12:00 AM    Triglycerides (POC) 253.0 (A) 10/18/2017 09:07 AM     No results found for: BATOOL Sexton    Lab Results   Component Value Date/Time    TSH 1.050 04/09/2021 12:00 AM

## 2023-01-20 ENCOUNTER — OFFICE VISIT (OUTPATIENT)
Dept: INTERNAL MEDICINE CLINIC | Age: 43
End: 2023-01-20
Payer: COMMERCIAL

## 2023-01-20 VITALS
RESPIRATION RATE: 18 BRPM | HEIGHT: 69 IN | OXYGEN SATURATION: 99 % | SYSTOLIC BLOOD PRESSURE: 146 MMHG | BODY MASS INDEX: 46.65 KG/M2 | HEART RATE: 101 BPM | TEMPERATURE: 98.2 F | WEIGHT: 315 LBS | DIASTOLIC BLOOD PRESSURE: 92 MMHG

## 2023-01-20 DIAGNOSIS — I10 ESSENTIAL HYPERTENSION: Primary | ICD-10-CM

## 2023-01-20 DIAGNOSIS — F17.200 TOBACCO DEPENDENCE: ICD-10-CM

## 2023-01-20 DIAGNOSIS — Z23 NEEDS FLU SHOT: ICD-10-CM

## 2023-01-20 DIAGNOSIS — Z91.199 MEDICAL NON-COMPLIANCE: ICD-10-CM

## 2023-01-20 DIAGNOSIS — E78.00 HYPERCHOLESTEROLEMIA: ICD-10-CM

## 2023-01-20 RX ORDER — GUAIFENESIN 100 MG/5ML
81 LIQUID (ML) ORAL DAILY
Qty: 90 TABLET | Refills: 1 | Status: SHIPPED | OUTPATIENT
Start: 2023-01-20

## 2023-01-20 RX ORDER — ATORVASTATIN CALCIUM 10 MG/1
10 TABLET, FILM COATED ORAL DAILY
Qty: 90 TABLET | Refills: 1 | Status: SHIPPED | OUTPATIENT
Start: 2023-01-20

## 2023-01-20 RX ORDER — VALSARTAN 160 MG/1
160 TABLET ORAL DAILY
Qty: 90 TABLET | Refills: 1 | Status: SHIPPED | OUTPATIENT
Start: 2023-01-20

## 2023-01-20 RX ORDER — METOPROLOL SUCCINATE 50 MG/1
50 TABLET, EXTENDED RELEASE ORAL DAILY
Qty: 90 TABLET | Refills: 1 | Status: SHIPPED | OUTPATIENT
Start: 2023-01-20

## 2023-01-20 NOTE — PROGRESS NOTES
Bill Pena is a 36 y.o. male     Chief Complaint   Patient presents with    Hypertension     Follow up       Visit Vitals  BP (!) 146/92 (BP 1 Location: Left upper arm, BP Patient Position: Sitting, BP Cuff Size: Adult)   Pulse (!) 101   Temp 98.2 °F (36.8 °C) (Oral)   Resp 18   Ht 5' 9\" (1.753 m)   Wt 331 lb 3.2 oz (150.2 kg)   SpO2 99%   BMI 48.91 kg/m²       Health Maintenance Due   Topic Date Due    Pneumococcal 0-64 years (1 - PCV) Never done    DTaP/Tdap/Td series (1 - Tdap) Never done    COVID-19 Vaccine (3 - Booster) 06/09/2021    Flu Vaccine (1) 08/01/2022         1. \"Have you been to the ER, urgent care clinic since your last visit? Hospitalized since your last visit? \" No    2. \"Have you seen or consulted any other health care providers outside of the [address] [place] [address] since your last visit? \" No     3. For patients aged 39-70: Has the patient had a colonoscopy / FIT/ Cologuard? NA - based on age      If the patient is female:    4. For patients aged 41-77: Has the patient had a mammogram within the past 2 years? NA - based on age or sex      11. For patients aged 21-65: Has the patient had a pap smear? NA - based on age or sex    Administered influenza vaccine in left deltoid patient tolerated injection well.     Lot#:[id number]    Exp:June 21 2023    DIY:[id number]

## 2023-01-20 NOTE — PATIENT INSTRUCTIONS
Vaccine Information Statement    Influenza (Flu) Vaccine (Inactivated or Recombinant): What You Need to Know    Many vaccine information statements are available in Sami and other languages. See www.immunize.org/vis. Hojas de información sobre vacunas están disponibles en español y en muchos otros idiomas. Visite www.immunize.org/vis. 1. Why get vaccinated? Influenza vaccine can prevent influenza (flu). Flu is a contagious disease that spreads around the [facility] every year, usually between October and May. Anyone can get the flu, but it is more dangerous for some people. Infants and young children, people 72 years and older, pregnant people, and people with certain health conditions or a weakened immune system are at greatest risk of flu complications. Pneumonia, bronchitis, sinus infections, and ear infections are examples of flu-related complications. If you have a medical condition, such as heart disease, cancer, or diabetes, flu can make it worse. Flu can cause fever and chills, sore throat, muscle aches, fatigue, cough, headache, and runny or stuffy nose. Some people may have vomiting and diarrhea, though this is more common in children than adults. In an average year, thousands of people in the [facility] die from flu, and many more are hospitalized. Flu vaccine prevents millions of illnesses and flu-related visits to the doctor each year. 2. Influenza vaccines     CDC recommends everyone 6 months and older get vaccinated every flu season. Children 6 months through 6years of age may need 2 doses during a single flu season. Everyone else needs only 1 dose each flu season. It takes about 2 weeks for protection to develop after vaccination. There are many flu viruses, and they are always changing. Each year a new flu vaccine is made to protect against the influenza viruses believed to be likely to cause disease in the upcoming flu season.  Even when the vaccine doesnt exactly match these viruses, it may still provide some protection. Influenza vaccine does not cause flu. Influenza vaccine may be given at the same time as other vaccines. 3. Talk with your health care provider    Tell your vaccination provider if the person getting the vaccine:  Has had an allergic reaction after a previous dose of influenza vaccine, or has any severe, life-threatening allergies   Has ever had Guillain-Barré Syndrome (also called GBS)    In some cases, your health care provider may decide to postpone influenza vaccination until a future visit. Influenza vaccine can be administered at any time during pregnancy. People who are or will be pregnant during influenza season should receive inactivated influenza vaccine. People with minor illnesses, such as a cold, may be vaccinated. People who are moderately or severely ill should usually wait until they recover before getting influenza vaccine. Your health care provider can give you more information. 4. Risks of a vaccine reaction    Soreness, redness, and swelling where the shot is given, fever, muscle aches, and headache can happen after influenza vaccination. There may be a very small increased risk of Guillain-Barré Syndrome (GBS) after inactivated influenza vaccine (the flu shot). Mona Finely children who get the flu shot along with pneumococcal vaccine (PCV13) and/or DTaP vaccine at the same time might be slightly more likely to have a seizure caused by fever. Tell your health care provider if a child who is getting flu vaccine has ever had a seizure. People sometimes faint after medical procedures, including vaccination. Tell your provider if you feel dizzy or have vision changes or ringing in the ears. As with any medicine, there is a very remote chance of a vaccine causing a severe allergic reaction, other serious injury, or death. 5. What if there is a serious problem?     An allergic reaction could occur after the vaccinated person leaves the clinic. If you see signs of a severe allergic reaction (hives, swelling of the face and throat, difficulty breathing, a fast heartbeat, dizziness, or weakness), call 9-1-1 and get the person to the nearest hospital.    For other signs that concern you, call your health care provider. Adverse reactions should be reported to the Vaccine Adverse Event Reporting System (VAERS). Your health care provider will usually file this report, or you can do it yourself. Visit the VAERS website at www.vaers. [facility].gov or call 2-873.853.6373. VAERS is only for reporting reactions, and VAERS staff members do not give medical advice. 6. The National Vaccine Injury Compensation Program    The [facility] Vaccine Injury Compensation Program (VICP) is a federal program that was created to compensate people who may have been injured by certain vaccines. Claims regarding alleged injury or death due to vaccination have a time limit for filing, which may be as short as two years. Visit the VICP website at www.[facility].gov/vaccinecompensation or call 9-954.900.2971 to learn about the program and about filing a claim. 7. How can I learn more? Ask your health care provider. Call your local or state health department. Visit the website of the Food and Drug Administration (FDA) for vaccine package inserts and additional information at www.fda.gov/vaccines-blood-biologics/vaccines. Contact the Centers for Disease Control and Prevention (CDC): Call 8-816.515.2190 (1-800-CDC-INFO) or  Visit CDCs influenza website at www.cdc.gov/flu. Vaccine Information Statement   Inactivated Influenza Vaccine   8/6/2021  42 VIRI Encarnacion [id number]   Department of Health and Human Services  Centers for Disease Control and Prevention    Office Use Only

## 2023-01-20 NOTE — PROGRESS NOTES
Chief Complaint   Patient presents with    Hypertension     Follow up       SUBJECTIVE:    Khadra Hawthorne is a 36 y.o. male who is here today for a follow up appointment regarding his medical history including: Essential hypertension with tachycardia and hypercholesterolemia. He states he is feeling fairly well overall. Apparently, the patient discontinued all of his current medications several months ago after ananth COVID, and the perception that he was gaining weight as a side effect of these medications. \"I am heavier than I have ever been. \"  The patient states that on around the time that he had COVID, he began to miss several doses of his medications, and subsequently stopped them altogether. He denies any changes or restrictions in his diet, and states that he eats \"2000 calories a day. \"  He denies any recent episodes of chest pain, chest pressure shortness of breath, headaches, dizziness, blurred vision, palpitations, or syncope episodes. His blood pressure is noticeably elevated today as is his heart rate. He continues to smoke cigarettes despite health risks. Current Outpatient Medications   Medication Sig Dispense Refill    metoprolol succinate (TOPROL-XL) 50 mg XL tablet Take 1 Tablet by mouth daily. 90 Tablet 1    valsartan (DIOVAN) 160 mg tablet Take 1 Tablet by mouth daily. 90 Tablet 1    atorvastatin (LIPITOR) 10 mg tablet Take 1 Tablet by mouth daily. Indications: high cholesterol 90 Tablet 1    aspirin 81 mg chewable tablet Take 1 Tablet by mouth daily. 90 Tablet 1    albuterol (PROVENTIL HFA, VENTOLIN HFA, PROAIR HFA) 90 mcg/actuation inhaler Take 1 Puff by inhalation every four (4) hours as needed for Wheezing. Indications:  WHEEZING (Patient not taking: Reported on 1/20/2023) 1 Each 3     Past Medical History:   Diagnosis Date    Cigarette nicotine dependence without complication 51/07/[birth year]19    Essential hypertension 10/9/2017    Panic anxiety syndrome 10/9/2017    Recurrent kidney stones 10/9/2017     History reviewed. No pertinent surgical history.   No Known Allergies    REVIEW OF SYSTEMS:                                        POSITIVE= bold text  Negative = regular text    General:                     fever, chills, sweats, generalized weakness, weight loss/gain,                                       loss of appetite   Eyes:                           blurred vision, eye pain, loss of vision, double vision  ENT:                            rhinorrhea, pharyngitis   Respiratory:               cough, sputum production, SOB, BOLES, wheezing, pleuritic pain   Cardiology:                chest pain, palpitations, orthopnea, PND, edema, syncope   Gastrointestinal:       abdominal pain , N/V, diarrhea, dysphagia, constipation, bleeding   Genitourinary:           frequency, urgency, dysuria, hematuria, incontinence   Muskuloskeletal :      arthralgia, myalgia, back pain  Hematology:              easy bruising, nose or gum bleeding, lymphadenopathy   Dermatological:         rash, ulceration, pruritis, color change / jaundice  Endocrine:                 hot flashes or polydipsia   Neurological:             headache, dizziness, confusion, focal weakness, paresthesia,                                      Speech difficulties, memory loss, gait difficulty  Psychological:          Feelings of anxiety, depression, agitation        Social History     Socioeconomic History    Marital status:    Tobacco Use    Smoking status: Every Day     Packs/day: 0.50     Years: 20.00     Pack years: 10.00     Types: Cigarettes    Smokeless tobacco: Former     Types: Snuff     Quit date: 2019   Vaping Use    Vaping Use: Never used   Substance and Sexual Activity    Alcohol use: Yes     Comment: socially     Social Determinants of Health     Financial Resource Strain: Low Risk     Difficulty of Paying Living Expenses: Not hard at all   Food Insecurity: No Food Insecurity    Worried About Running Out of Food in the Last Year: Never true    Ran Out of Food in the Last Year: Never true     Family History   Problem Relation Age of Onset    Cancer Father     Heart Disease Father        OBJECTIVE:     Visit Vitals  BP (!) 146/92 (BP 1 Location: Left upper arm, BP Patient Position: Sitting, BP Cuff Size: Adult)   Pulse (!) 101   Temp 98.2 °F (36.8 °C) (Oral)   Resp 18   Ht 5' 9\" (1.753 m)   Wt 331 lb 3.2 oz (150.2 kg)   SpO2 99%   BMI 48.91 kg/m²       Constitutional: He appears well nourished, of stated age, and dressed appropriately. Eyes: Sclera anicteric, PERRLA, EOMI  Neck: Supple without lymphadenopathy. Respiratory: Clear to ascultation X5, normal inspiratory effort, no adventitious breath sounds. Cardiovascular: Tachycardic rate and regular rhythm, no significant murmurs, rubs or gallops, PMI not displaced, No thrills, no peripheral edema  Neuro: Non-focal exam, A & O X 3.   Psychiatric: Appropriate affect and demeanor, pleasant and cooperative. Patient's thought content and thought processing appear to be within normal limits. ASSESSMENT/PLAN:     1. Essential hypertension  Patient to resume current medications as follows for management of hypertension:  - metoprolol succinate (TOPROL-XL) 50 mg XL tablet; Take 1 Tablet by mouth daily. Dispense: 90 Tablet; Refill: 1  - valsartan (DIOVAN) 160 mg tablet; Take 1 Tablet by mouth daily. Dispense: 90 Tablet; Refill: 1  - aspirin 81 mg chewable tablet; Take 1 Tablet by mouth daily. Dispense: 90 Tablet; Refill: 1    2. Hypercholesterolemia  Patient to resume medication for management of hypercholesterolemia. - atorvastatin (LIPITOR) 10 mg tablet; Take 1 Tablet by mouth daily. Indications: high cholesterol  Dispense: 90 Tablet; Refill: 1    3. Tobacco dependence  Patient directed to discontinue use due to health risk. 4. Needs flu shot  Flu shot administered in office today. Patient tolerated well.   - INFLUENZA, FLUARIX, FLULAVAL, FLUZONE (AGE 6 MO+), AFLURIA(AGE 3Y+) IM, PF, 0.5 ML    5: Patient to follow-up with me in approximately 3 months for blood pressure recheck and fasting labs. Patient states understanding and agrees with plan. Orders Placed This Encounter    Influenza, FLUARIX, FLULAVAL, Fluzone (age 10 mo+), AFLURIA (age 3y+) IM, PF, 0.5 mL    metoprolol succinate (TOPROL-XL) 50 mg XL tablet    valsartan (DIOVAN) 160 mg tablet    atorvastatin (LIPITOR) 10 mg tablet    aspirin 81 mg chewable tablet         ATTENTION:   This medical record was transcribed using an electronic medical records system. Although proofread, it may and can contain electronic and spelling errors. Other human spelling and other errors may be present. Corrections may be executed at a later time. Please feel free to contact us for any clarifications as needed. Follow-up and Dispositions    Return in about 3 months (around 4/20/2023) for Follow-up with fasting labs. Jillian Frances, ALEXANDRA APRN FNP-BC

## 2023-04-27 ENCOUNTER — OFFICE VISIT (OUTPATIENT)
Dept: INTERNAL MEDICINE CLINIC | Age: 43
End: 2023-04-27
Payer: COMMERCIAL

## 2023-04-27 VITALS
BODY MASS INDEX: 46.65 KG/M2 | TEMPERATURE: 98.1 F | OXYGEN SATURATION: 97 % | HEIGHT: 69 IN | RESPIRATION RATE: 16 BRPM | HEART RATE: 79 BPM | DIASTOLIC BLOOD PRESSURE: 86 MMHG | SYSTOLIC BLOOD PRESSURE: 130 MMHG | WEIGHT: 315 LBS

## 2023-04-27 DIAGNOSIS — Z00.00 ROUTINE PHYSICAL EXAMINATION: Primary | ICD-10-CM

## 2023-04-27 DIAGNOSIS — I10 ESSENTIAL HYPERTENSION: ICD-10-CM

## 2023-04-27 DIAGNOSIS — R06.09 DOE (DYSPNEA ON EXERTION): ICD-10-CM

## 2023-04-27 DIAGNOSIS — E66.01 MORBID OBESITY WITH BMI OF 45.0-49.9, ADULT (HCC): ICD-10-CM

## 2023-04-27 DIAGNOSIS — G47.33 OSA ON CPAP: ICD-10-CM

## 2023-04-27 DIAGNOSIS — Z99.89 OSA ON CPAP: ICD-10-CM

## 2023-04-27 DIAGNOSIS — Z71.6 TOBACCO ABUSE COUNSELING: ICD-10-CM

## 2023-04-27 DIAGNOSIS — Z79.899 ON STATIN THERAPY: ICD-10-CM

## 2023-04-27 DIAGNOSIS — E78.00 HYPERCHOLESTEROLEMIA: ICD-10-CM

## 2023-04-27 DIAGNOSIS — F17.200 TOBACCO DEPENDENCE: ICD-10-CM

## 2023-04-27 PROCEDURE — 3079F DIAST BP 80-89 MM HG: CPT | Performed by: NURSE PRACTITIONER

## 2023-04-27 PROCEDURE — 99396 PREV VISIT EST AGE 40-64: CPT | Performed by: NURSE PRACTITIONER

## 2023-04-27 PROCEDURE — 93000 ELECTROCARDIOGRAM COMPLETE: CPT | Performed by: NURSE PRACTITIONER

## 2023-04-27 PROCEDURE — 3075F SYST BP GE 130 - 139MM HG: CPT | Performed by: NURSE PRACTITIONER

## 2023-04-27 RX ORDER — SEMAGLUTIDE 0.25 MG/.5ML
0.25 INJECTION, SOLUTION SUBCUTANEOUS
Qty: 4 EACH | Refills: 0 | Status: SHIPPED | OUTPATIENT
Start: 2023-04-27

## 2023-04-27 NOTE — PROGRESS NOTES
Chief Complaint   Patient presents with    Complete Physical       SUBJECTIVE:    Adryan Venegas is a 36 y.o. male who is here today for a routine physical exam as well as follow-up appointment regarding current medical conditions including: Essential hypertension, hypercholesterolemia, morbid obesity, LAURA on CPAP, tobacco dependence, and long-term use of statin therapy. The patient is currently on a combination of metoprolol succinate as well as valsartan for management of his hypertension. His blood pressure is stable and well controlled at this time. He denies any adverse side effects of the medication. He is also currently taking atorvastatin due to his hypercholesterolemia. He states he is tolerating this well. He denies any recent episodes of chest pain, chest pressure, headaches, dizziness, blurred vision, palpitations, or syncope episodes. However, he does admit to some worsening dyspnea on exertion which she feels is likely due to his continued weight gain. He has a history of obstructive sleep apnea, and is supposed to be using a CPAP nightly for management. However, he states his mask is not fitting properly, and has not been using the unit for some time now. He continues to smoke cigarettes, and typically has 1-2 a day on average. He does plan to quit. He is concerned about his continued weight gain despite efforts to reduce his caloric intake and exercise. He states he is trying to avoid offending foods, does not eat fast food regularly, but admits to a fair amount of starches and carbohydrates. 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 Current Outpatient Medications   Medication Sig Dispense Refill    semaglutide, weight loss, (Wegovy) 0.25 mg/0.5 mL pnij 0.25 mg by SubCUTAneous route every seven (7) days.  4 Each 0    metoprolol succinate (TOPROL-XL) 50 mg XL tablet Take 1 Tablet by mouth daily. 90 Tablet 1    valsartan (DIOVAN) 160 mg tablet Take 1 Tablet by mouth daily. 90 Tablet 1    atorvastatin (LIPITOR) 10 mg tablet Take 1 Tablet by mouth daily. Indications: high cholesterol 90 Tablet 1    aspirin 81 mg chewable tablet Take 1 Tablet by mouth daily. 90 Tablet 1    albuterol (PROVENTIL HFA, VENTOLIN HFA, PROAIR HFA) 90 mcg/actuation inhaler Take 1 Puff by inhalation every four (4) hours as needed for Wheezing. Indications: WHEEZING (Patient not taking: Reported on 1/20/2023) 1 Each 3     Past Medical History:   Diagnosis Date    Cigarette nicotine dependence without complication 01/42/4112    Essential hypertension 10/9/2017    Panic anxiety syndrome 10/9/2017    Recurrent kidney stones 10/9/2017     History reviewed. No pertinent surgical history.   No Known Allergies    REVIEW OF SYSTEMS:                                        POSITIVE= bold text  Negative = regular text    General:                     fever, chills, sweats, generalized weakness, weight loss/gain,                                       loss of appetite   Eyes:                           blurred vision, eye pain, loss of vision, double vision  ENT:                            rhinorrhea, pharyngitis   Respiratory:               cough, sputum production, SOB, BOLES, wheezing, pleuritic pain   Cardiology:                chest pain, palpitations, orthopnea, PND, edema, syncope   Gastrointestinal:       abdominal pain , N/V, diarrhea, dysphagia, constipation, bleeding   Genitourinary:           frequency, urgency, dysuria, hematuria, incontinence   Muskuloskeletal :      arthralgia, myalgia, back pain  Hematology:              easy bruising, nose or gum bleeding, lymphadenopathy   Dermatological:         rash, ulceration, pruritis, color change / jaundice  Endocrine:                 hot flashes or polydipsia   Neurological:             headache, dizziness, confusion, focal weakness, paresthesia, Speech difficulties, memory loss, gait difficulty  Psychological:          Feelings of anxiety, depression, agitation        Social History     Socioeconomic History    Marital status:    Tobacco Use    Smoking status: Every Day     Packs/day: 0.50     Years: 20.00     Pack years: 10.00     Types: Cigarettes    Smokeless tobacco: Former     Types: Snuff     Quit date: 2019   Vaping Use    Vaping Use: Never used   Substance and Sexual Activity    Alcohol use: Yes     Comment: socially     Social Determinants of Health     Financial Resource Strain: Low Risk     Difficulty of Paying Living Expenses: Not hard at all   Food Insecurity: No Food Insecurity    Worried About Running Out of Food in the Last Year: Never true    Ran Out of Food in the Last Year: Never true     Family History   Problem Relation Age of Onset    Cancer Father     Heart Disease Father        OBJECTIVE:     Visit Vitals  /86 (BP 1 Location: Left upper arm, BP Patient Position: Sitting, BP Cuff Size: Adult)   Pulse 79   Temp 98.1 °F (36.7 °C) (Oral)   Resp 16   Ht 5' 9\" (1.753 m)   Wt 334 lb 9.6 oz (151.8 kg)   SpO2 97%   BMI 49.41 kg/m²       Constitutional: He appears well nourished, of stated age, and dressed appropriately. Eyes: Sclera anicteric, PERRLA, EOMI  ENT: Nares clear, moist mucous membranes, pharynx clear  Neck: Supple without lymphadenopathy. Thyroid normal, No JVD or bruits  Respiratory: Clear to ascultation X5, normal inspiratory effort, no adventitious breath sounds. Cardiovascular: Regular rate and rhythm, no discernible murmurs due to body habitus, no rubs or gallops, PMI not displaced, No thrills. Gastrointestinal: Abdomen non-distended, soft, non-tender, bowel sounds normal and active X4. Hematologic: No purpura, petechiae or unexplained bruising  Lymphatic: No lymph node enlargemant. Integumentary: No unusual rashes or suspicious skin lesions noted.  Nails appear normal.  Neuro: Non-focal exam, A & O X 3.   Psychiatric: Appropriate affect and demeanor, pleasant and cooperative. Patient's thought content and thought processing appear to be within normal limits. ASSESSMENT/PLAN:     ICD-10-CM ICD-9-CM    1. Routine physical examination  Z00.00 V70.0 TSH 3RD GENERATION      URINALYSIS W/ RFLX MICROSCOPIC      URINALYSIS W/ RFLX MICROSCOPIC      TSH 3RD GENERATION      2. Essential hypertension  I10 401.9 CBC W/O DIFF      METABOLIC PANEL, COMPREHENSIVE      TSH 3RD GENERATION      TSH 3RD GENERATION      METABOLIC PANEL, COMPREHENSIVE      CBC W/O DIFF      AMB POC EKG ROUTINE W/ 12 LEADS, INTER & REP      3. Morbid obesity with BMI of 45.0-49.9, adult (HCC)  E66.01 278.01 semaglutide, weight loss, (Wegovy) 0.25 mg/0.5 mL pnij    Z68.42 V85.42       4. Hypercholesterolemia  E78.00 272.0 LIPID PANEL      LIPID PANEL      AMB POC EKG ROUTINE W/ 12 LEADS, INTER & REP      5. LAURA on CPAP  G47.33 327.23     Z99.89 V46.8       6. Tobacco dependence  F17.200 305.1       7. Tobacco abuse counseling  Z71.6 V65.42      305.1       8. On statin therapy  Z79.899 V58.69       9. BOLES (dyspnea on exertion)  R06.09 786.09 AMB POC EKG ROUTINE W/ 12 LEADS, INTER & REP        1: Labs ordered today include: CBC, CMP, lipid panel, TSH, and urinalysis. 2: EKG performed in office and read by me. Aberrant PVCs noted with no significant ST elevation/depression, and no signs of T wave ischemic changes compared to prior study. Otherwise unremarkable. 3: Patient to continue metoprolol succinate and valsartan daily for management of hypertension. Blood pressure appears stable and well controlled. 4: Continue atorvastatin 10 mg nightly for management of hypercholesterolemia. Patient tolerating well.  5: Patient will be given prescription for Wegovy 0.25 mg to be injected weekly x4 weeks. Will increase as tolerated. 6: Patient advised on healthy dietary intake.   Suggested Mediterranean diet with reduction in concentrated sweets and carbohydrates. Handout given to patient. 7: Advised patient to seek attention regarding CPAP mask. Risks and benefits were thoroughly discussed of continued use for management of LAURA.  8: Patient counseled on cessation of tobacco due to health risks. Patient acknowledges. 9: Patient to follow-up with me in approximately 6 months, or sooner as needed. Patient states understanding and agrees with plan. Orders Placed This Encounter    CBC W/O DIFF    METABOLIC PANEL, COMPREHENSIVE    LIPID PANEL    TSH 3RD GENERATION    URINALYSIS W/ RFLX MICROSCOPIC    semaglutide, weight loss, (Wegovy) 0.25 mg/0.5 mL pnij         ATTENTION:   This medical record was transcribed using an electronic medical records system. Although proofread, it may and can contain electronic and spelling errors. Other human spelling and other errors may be present. Corrections may be executed at a later time. Please feel free to contact us for any clarifications as needed. Follow-up and Dispositions    Return in about 6 months (around 10/27/2023) for Follow up, fasting labs. Signed,  Ricardo Hodgkins.  Jorge Burns, MSN APRN FNP-BC

## 2023-04-27 NOTE — PROGRESS NOTES
Moriah Farias is a 36 y.o. male     Chief Complaint   Patient presents with    Complete Physical       There were no vitals taken for this visit. Health Maintenance Due   Topic Date Due    Pneumococcal 0-64 years (1 - PCV) Never done    DTaP/Tdap/Td series (1 - Tdap) Never done    COVID-19 Vaccine (3 - Booster) 06/09/2021    Lipid Screen  03/15/2023         1. \"Have you been to the ER, urgent care clinic since your last visit? Hospitalized since your last visit? \" No    2. \"Have you seen or consulted any other health care providers outside of the [address] [place] [address] since your last visit? \" No     3. For patients aged 39-70: Has the patient had a colonoscopy / FIT/ Cologuard? NA - based on age      If the patient is female:    4. For patients aged 41-77: Has the patient had a mammogram within the past 2 years? NA - based on age or sex      11. For patients aged 21-65: Has the patient had a pap smear?  NA - based on age or sex

## 2023-04-28 LAB
ALBUMIN SERPL-MCNC: 4.8 G/DL (ref 4–5)
ALBUMIN/GLOB SERPL: 1.5 {RATIO} (ref 1.2–2.2)
ALP SERPL-CCNC: 83 IU/L (ref 44–121)
ALT SERPL-CCNC: 56 IU/L (ref 0–44)
APPEARANCE UR: CLEAR
AST SERPL-CCNC: 38 IU/L (ref 0–40)
BILIRUB SERPL-MCNC: 0.3 MG/DL (ref 0–1.2)
BILIRUB UR QL STRIP: NEGATIVE
BUN SERPL-MCNC: 13 MG/DL (ref 6–24)
BUN/CREAT SERPL: 12 (ref 9–20)
CALCIUM SERPL-MCNC: 9.8 MG/DL (ref 8.7–10.2)
CHLORIDE SERPL-SCNC: 97 MMOL/L (ref 96–106)
CHOLEST SERPL-MCNC: 176 MG/DL (ref 100–199)
CO2 SERPL-SCNC: 24 MMOL/L (ref 20–29)
COLOR UR: YELLOW
CREAT SERPL-MCNC: 1.09 MG/DL (ref 0.76–1.27)
EGFRCR SERPLBLD CKD-EPI 2021: 86 ML/MIN/1.73
ERYTHROCYTE [DISTWIDTH] IN BLOOD BY AUTOMATED COUNT: 12.7 % (ref 11.6–15.4)
GLOBULIN SER CALC-MCNC: 3.2 G/DL (ref 1.5–4.5)
GLUCOSE SERPL-MCNC: 83 MG/DL (ref 70–99)
GLUCOSE UR QL STRIP: NEGATIVE
HCT VFR BLD AUTO: 49.5 % (ref 37.5–51)
HDLC SERPL-MCNC: 47 MG/DL
HGB BLD-MCNC: 16.6 G/DL (ref 13–17.7)
HGB UR QL STRIP: NEGATIVE
KETONES UR QL STRIP: NEGATIVE
LDLC SERPL CALC-MCNC: 105 MG/DL (ref 0–99)
LEUKOCYTE ESTERASE UR QL STRIP: NEGATIVE
MCH RBC QN AUTO: 30.7 PG (ref 26.6–33)
MCHC RBC AUTO-ENTMCNC: 33.5 G/DL (ref 31.5–35.7)
MCV RBC AUTO: 92 FL (ref 79–97)
MICRO URNS: NORMAL
NITRITE UR QL STRIP: NEGATIVE
PH UR STRIP: 5 [PH] (ref 5–7.5)
PLATELET # BLD AUTO: 346 X10E3/UL (ref 150–450)
POTASSIUM SERPL-SCNC: 4.3 MMOL/L (ref 3.5–5.2)
PROT SERPL-MCNC: 8 G/DL (ref 6–8.5)
PROT UR QL STRIP: NEGATIVE
RBC # BLD AUTO: 5.41 X10E6/UL (ref 4.14–5.8)
SODIUM SERPL-SCNC: 137 MMOL/L (ref 134–144)
SP GR UR STRIP: 1.01 (ref 1–1.03)
TRIGL SERPL-MCNC: 136 MG/DL (ref 0–149)
TSH SERPL DL<=0.005 MIU/L-ACNC: 0.86 UIU/ML (ref 0.45–4.5)
UROBILINOGEN UR STRIP-MCNC: 0.2 MG/DL (ref 0.2–1)
VLDLC SERPL CALC-MCNC: 24 MG/DL (ref 5–40)
WBC # BLD AUTO: 15.1 X10E3/UL (ref 3.4–10.8)

## 2023-05-02 LAB — SPECIMEN STATUS REPORT, ROLRST: NORMAL

## 2023-05-09 ENCOUNTER — NURSE ONLY (OUTPATIENT)
Facility: CLINIC | Age: 43
End: 2023-05-09

## 2023-05-09 DIAGNOSIS — D72.829 LEUKOCYTOSIS, UNSPECIFIED TYPE: Primary | ICD-10-CM

## 2023-05-19 ENCOUNTER — TELEPHONE (OUTPATIENT)
Facility: CLINIC | Age: 43
End: 2023-05-19

## 2023-07-20 DIAGNOSIS — I10 ESSENTIAL (PRIMARY) HYPERTENSION: ICD-10-CM

## 2023-07-20 DIAGNOSIS — E78.00 PURE HYPERCHOLESTEROLEMIA, UNSPECIFIED: ICD-10-CM

## 2023-07-20 RX ORDER — LORATADINE 10 MG
TABLET ORAL
Qty: 90 TABLET | Refills: 1 | Status: SHIPPED | OUTPATIENT
Start: 2023-07-20

## 2023-07-20 RX ORDER — ATORVASTATIN CALCIUM 10 MG/1
TABLET, FILM COATED ORAL
Qty: 90 TABLET | Refills: 1 | Status: SHIPPED | OUTPATIENT
Start: 2023-07-20

## 2023-07-20 NOTE — TELEPHONE ENCOUNTER
PCP: NOE Luke NP    Last appt: 4/27/2023  Future Appointments   Date Time Provider [address]   10/27/2023  9:00 AM NOE Luke NP [facility] AM[facility]       Requested Prescriptions     Pending Prescriptions Disp Refills    [facility] ASPIRIN ADULT LOW DOSE 81 MG chewable tablet [Pharmacy Med Name: [facility] ASPIRIN 81 MG CHEWABLE TAB] 90 tablet 1     Sig: TAKE 1 TABLET BY MOUTH EVERY DAY    atorvastatin (LIPITOR) 10 MG tablet [Pharmacy Med Name: ATORVASTATIN 10 MG TABLET] 90 tablet 1     Sig: TAKE 1 TABLET BY MOUTH DAILY.  INDICATIONS: HIGH CHOLESTEROL       Prior labs and Blood pressures:  BP Readings from Last 3 Encounters:   04/27/23 130/86   01/20/23 (!) 146/92   03/15/22 138/88     Lab Results   Component Value Date/Time     04/27/2023 12:00 AM    K 4.3 04/27/2023 12:00 AM    CL 97 04/27/2023 12:00 AM    CO2 24 04/27/2023 12:00 AM    BUN 13 04/27/2023 12:00 AM    GFRAA 111 11/29/2021 12:00 AM     No results found for: HBA1C, BDJ1GHVX  Lab Results   Component Value Date/Time    CHOL 176 04/27/2023 12:00 AM    HDL 47 04/27/2023 12:00 AM    VLDL 24 04/27/2023 12:00 AM     No results found for: VITD3, VD3RIA        Lab Results   Component Value Date/Time    TSH 0.860 04/27/2023 12:00 AM

## 2023-09-05 RX ORDER — ALBUTEROL SULFATE 90 UG/1
AEROSOL, METERED RESPIRATORY (INHALATION)
Qty: 6.7 EACH | Refills: 2 | Status: SHIPPED | OUTPATIENT
Start: 2023-09-05

## 2023-09-05 NOTE — TELEPHONE ENCOUNTER
PCP: NOE Holm NP    Last appt: 4/27/2023    Future Appointments   Date Time Provider [address]   10/27/2023  9:00 AM NOE Holm NP [facility]       Requested Prescriptions     Pending Prescriptions Disp Refills    albuterol sulfate HFA (PROVENTIL;VENTOLIN;PROAIR) 108 (90 Base) MCG/ACT inhaler [Pharmacy Med Name: ALBUTEROL HFA (PROVENTIL) INH] 6.7 each 2     Sig: USE 1 PUFF BY MOUTH EVERY 4 HOURS AS NEEDED FOR WHEEZING

## 2023-11-28 ENCOUNTER — OFFICE VISIT (OUTPATIENT)
Facility: CLINIC | Age: 43
End: 2023-11-28
Payer: COMMERCIAL

## 2023-11-28 VITALS
HEART RATE: 89 BPM | OXYGEN SATURATION: 98 % | SYSTOLIC BLOOD PRESSURE: 124 MMHG | DIASTOLIC BLOOD PRESSURE: 74 MMHG | WEIGHT: 315 LBS | BODY MASS INDEX: 46.65 KG/M2 | HEIGHT: 69 IN | RESPIRATION RATE: 20 BRPM | TEMPERATURE: 98.5 F

## 2023-11-28 DIAGNOSIS — F17.210 CIGARETTE NICOTINE DEPENDENCE WITHOUT COMPLICATION: ICD-10-CM

## 2023-11-28 DIAGNOSIS — E78.00 PURE HYPERCHOLESTEROLEMIA: ICD-10-CM

## 2023-11-28 DIAGNOSIS — Z80.7 FAMILY HISTORY OF MULTIPLE MYELOMA: ICD-10-CM

## 2023-11-28 DIAGNOSIS — R06.2 WHEEZING: ICD-10-CM

## 2023-11-28 DIAGNOSIS — D72.829 LEUKOCYTOSIS, UNSPECIFIED TYPE: ICD-10-CM

## 2023-11-28 DIAGNOSIS — I10 ESSENTIAL HYPERTENSION: ICD-10-CM

## 2023-11-28 DIAGNOSIS — E66.01 CLASS 3 SEVERE OBESITY DUE TO EXCESS CALORIES WITH SERIOUS COMORBIDITY AND BODY MASS INDEX (BMI) OF 45.0 TO 49.9 IN ADULT (HCC): Primary | ICD-10-CM

## 2023-11-28 PROBLEM — E66.813 CLASS 3 SEVERE OBESITY DUE TO EXCESS CALORIES WITH SERIOUS COMORBIDITY AND BODY MASS INDEX (BMI) OF 45.0 TO 49.9 IN ADULT: Status: ACTIVE | Noted: 2021-04-06

## 2023-11-28 PROCEDURE — 3078F DIAST BP <80 MM HG: CPT | Performed by: NURSE PRACTITIONER

## 2023-11-28 PROCEDURE — 3074F SYST BP LT 130 MM HG: CPT | Performed by: NURSE PRACTITIONER

## 2023-11-28 PROCEDURE — 99214 OFFICE O/P EST MOD 30 MIN: CPT | Performed by: NURSE PRACTITIONER

## 2023-11-28 RX ORDER — ALBUTEROL SULFATE 90 UG/1
2 AEROSOL, METERED RESPIRATORY (INHALATION) EVERY 6 HOURS PRN
Qty: 18 G | Refills: 3 | Status: SHIPPED | OUTPATIENT
Start: 2023-11-28

## 2023-11-28 SDOH — ECONOMIC STABILITY: FOOD INSECURITY: WITHIN THE PAST 12 MONTHS, THE FOOD YOU BOUGHT JUST DIDN'T LAST AND YOU DIDN'T HAVE MONEY TO GET MORE.: NEVER TRUE

## 2023-11-28 SDOH — ECONOMIC STABILITY: HOUSING INSECURITY
IN THE LAST 12 MONTHS, WAS THERE A TIME WHEN YOU DID NOT HAVE A STEADY PLACE TO SLEEP OR SLEPT IN A SHELTER (INCLUDING NOW)?: NO

## 2023-11-28 SDOH — ECONOMIC STABILITY: TRANSPORTATION INSECURITY
IN THE PAST 12 MONTHS, HAS LACK OF TRANSPORTATION KEPT YOU FROM MEETINGS, WORK, OR FROM GETTING THINGS NEEDED FOR DAILY LIVING?: NO

## 2023-11-28 SDOH — ECONOMIC STABILITY: INCOME INSECURITY: HOW HARD IS IT FOR YOU TO PAY FOR THE VERY BASICS LIKE FOOD, HOUSING, MEDICAL CARE, AND HEATING?: NOT HARD AT ALL

## 2023-11-28 SDOH — ECONOMIC STABILITY: FOOD INSECURITY: WITHIN THE PAST 12 MONTHS, YOU WORRIED THAT YOUR FOOD WOULD RUN OUT BEFORE YOU GOT MONEY TO BUY MORE.: NEVER TRUE

## 2023-11-28 NOTE — PROGRESS NOTES
Chief Complaint   Patient presents with    Hypertension     5M       SUBJECTIVE:    Radha Gonzalez is a 39 y.o. male who is here today for a follow up appointment regarding current medical conditions including: Class III severe obesity, essential hypertension, pure hypercholesterolemia, and ongoing nicotine dependence. The patient is currently on a combination of metoprolol succinate and valsartan daily for management of his hypertension. His blood pressure has remained stable and in good control overall. He is tolerating the medication well. He is currently on atorvastatin daily for management of his hypercholesterolemia. He denies any adverse side effects of the medication. He denies any recent episodes of chest pain, chest pressure, shortness of breath, headaches, dizziness, blurred vision, palpitations, or syncope episodes. He continues to be periodically monitored due to his obesity. He states he is trying to reduce his caloric intake and occasionally walks for exercise but not regularly. He had previously been advised to look into diet programs. He plans on seeking a possible nutritionist in the future. He continues to smoke approximately half pack cigarettes a day. He was found to have some abnormality on prior labs including elevated white cell count that has remained persistent for the past 2+ years. He also admits to family history of multiple myeloma in mother.       Current Outpatient Medications   Medication Sig Dispense Refill    albuterol sulfate HFA (PROVENTIL HFA) 108 (90 Base) MCG/ACT inhaler Inhale 2 puffs into the lungs every 6 hours as needed for Wheezing 18 g 3    albuterol sulfate HFA (PROVENTIL;VENTOLIN;PROAIR) 108 (90 Base) MCG/ACT inhaler USE 1 PUFF BY MOUTH EVERY 4 HOURS AS NEEDED FOR WHEEZING 6.7 each 2    CVS ASPIRIN ADULT LOW DOSE 81 MG chewable tablet TAKE 1 TABLET BY MOUTH EVERY DAY 90 tablet 1    atorvastatin (LIPITOR) 10 MG tablet TAKE 1 TABLET BY MOUTH

## 2023-12-05 ENCOUNTER — NURSE ONLY (OUTPATIENT)
Facility: CLINIC | Age: 43
End: 2023-12-05

## 2023-12-05 DIAGNOSIS — Z80.7 FAMILY HISTORY OF MULTIPLE MYELOMA: ICD-10-CM

## 2023-12-05 DIAGNOSIS — I10 ESSENTIAL HYPERTENSION: ICD-10-CM

## 2023-12-05 DIAGNOSIS — E78.00 PURE HYPERCHOLESTEROLEMIA: ICD-10-CM

## 2023-12-05 DIAGNOSIS — D72.829 LEUKOCYTOSIS, UNSPECIFIED TYPE: ICD-10-CM

## 2023-12-06 LAB
ALBUMIN SERPL-MCNC: 4.5 G/DL (ref 4.1–5.1)
ALBUMIN/GLOB SERPL: 1.5 {RATIO} (ref 1.2–2.2)
ALP SERPL-CCNC: 100 IU/L (ref 44–121)
ALT SERPL-CCNC: 47 IU/L (ref 0–44)
AST SERPL-CCNC: 41 IU/L (ref 0–40)
BILIRUB SERPL-MCNC: 0.3 MG/DL (ref 0–1.2)
BUN SERPL-MCNC: 9 MG/DL (ref 6–24)
BUN/CREAT SERPL: 9 (ref 9–20)
CALCIUM SERPL-MCNC: 9.6 MG/DL (ref 8.7–10.2)
CHLORIDE SERPL-SCNC: 96 MMOL/L (ref 96–106)
CHOLEST SERPL-MCNC: 222 MG/DL (ref 100–199)
CO2 SERPL-SCNC: 25 MMOL/L (ref 20–29)
CREAT SERPL-MCNC: 0.97 MG/DL (ref 0.76–1.27)
EGFRCR SERPLBLD CKD-EPI 2021: 99 ML/MIN/1.73
GLOBULIN SER CALC-MCNC: 3 G/DL (ref 1.5–4.5)
GLUCOSE SERPL-MCNC: 90 MG/DL (ref 70–99)
HDLC SERPL-MCNC: 57 MG/DL
LDLC SERPL CALC-MCNC: 134 MG/DL (ref 0–99)
POTASSIUM SERPL-SCNC: 4.5 MMOL/L (ref 3.5–5.2)
PROT SERPL-MCNC: 7.5 G/DL (ref 6–8.5)
SODIUM SERPL-SCNC: 136 MMOL/L (ref 134–144)
TRIGL SERPL-MCNC: 174 MG/DL (ref 0–149)
VLDLC SERPL CALC-MCNC: 31 MG/DL (ref 5–40)

## 2023-12-07 LAB
BASOPHILS # BLD AUTO: 0.1 X10E3/UL (ref 0–0.2)
BASOPHILS NFR BLD AUTO: 1 %
EOSINOPHIL # BLD AUTO: 0.2 X10E3/UL (ref 0–0.4)
EOSINOPHIL NFR BLD AUTO: 2 %
ERYTHROCYTE [DISTWIDTH] IN BLOOD BY AUTOMATED COUNT: 12.5 % (ref 11.6–15.4)
HCT VFR BLD AUTO: 49.7 % (ref 37.5–51)
HGB BLD-MCNC: 16.3 G/DL (ref 13–17.7)
IMM GRANULOCYTES # BLD AUTO: 0 X10E3/UL (ref 0–0.1)
IMM GRANULOCYTES NFR BLD AUTO: 0 %
LYMPHOCYTES # BLD AUTO: 3.1 X10E3/UL (ref 0.7–3.1)
LYMPHOCYTES NFR BLD AUTO: 22 %
MCH RBC QN AUTO: 30.5 PG (ref 26.6–33)
MCHC RBC AUTO-ENTMCNC: 32.8 G/DL (ref 31.5–35.7)
MCV RBC AUTO: 93 FL (ref 79–97)
MONOCYTES # BLD AUTO: 0.9 X10E3/UL (ref 0.1–0.9)
MONOCYTES NFR BLD AUTO: 6 %
NEUTROPHILS # BLD AUTO: 9.4 X10E3/UL (ref 1.4–7)
NEUTROPHILS NFR BLD AUTO: 69 %
PATH INTERP BLD-IMP: ABNORMAL
PATH REV BLD -IMP: ABNORMAL
PATHOLOGIST NAME: ABNORMAL
PLATELET # BLD AUTO: 306 X10E3/UL (ref 150–450)
RBC # BLD AUTO: 5.34 X10E6/UL (ref 4.14–5.8)
WBC # BLD AUTO: 13.7 X10E3/UL (ref 3.4–10.8)

## 2023-12-08 LAB
ALBUMIN MFR UR ELPH: 0 %
ALPHA1 GLOB MFR UR ELPH: 0 %
ALPHA2 GLOB MFR UR ELPH: 0 %
B-GLOBULIN MFR UR ELPH: 0 %
GAMMA GLOB MFR UR ELPH: 0 %
LABORATORY COMMENT REPORT: NORMAL
M PROTEIN MFR UR ELPH: NORMAL %
PROT UR-MCNC: <4 MG/DL

## 2024-01-12 ENCOUNTER — PATIENT MESSAGE (OUTPATIENT)
Facility: CLINIC | Age: 44
End: 2024-01-12

## 2024-01-12 RX ORDER — METOPROLOL SUCCINATE 50 MG/1
50 TABLET, EXTENDED RELEASE ORAL DAILY
Qty: 90 TABLET | Refills: 1 | Status: SHIPPED | OUTPATIENT
Start: 2024-01-12

## 2024-01-12 NOTE — TELEPHONE ENCOUNTER
PCP: Félix Gonzalez APRN - NP    Last appt: 11/28/2023    Future Appointments   Date Time Provider Department Center   5/28/2024  8:30 AM Félix Gonzalez APRN - NP [facility]       Requested Prescriptions     Pending Prescriptions Disp Refills    metoprolol succinate (TOPROL XL) 50 MG extended release tablet 90 tablet 1     Sig: Take 1 tablet by mouth daily            From: Dayne Case  To: Félix Gonzalez  Sent: 1/12/2024 12:18 PM EST  Subject: metoprolol succinate 50 MG extended release tablet refill    i need a refill on this. i dont have any.     metoprolol succinate 50 MG extended release tablet

## 2024-01-20 DIAGNOSIS — I10 ESSENTIAL (PRIMARY) HYPERTENSION: ICD-10-CM

## 2024-01-22 RX ORDER — VALSARTAN 160 MG/1
160 TABLET ORAL DAILY
Qty: 90 TABLET | Refills: 1 | Status: SHIPPED | OUTPATIENT
Start: 2024-01-22

## 2024-01-22 NOTE — TELEPHONE ENCOUNTER
PCP: Félix Gonzalez APRN - NP    Last appt: 11/28/2023    Future Appointments   Date Time Provider Department Center   5/28/2024  8:30 AM Félix Gonzalez APRN - NP [facility]       Requested Prescriptions     Pending Prescriptions Disp Refills    valsartan (DIOVAN) 160 MG tablet [Pharmacy Med Name: VALSARTAN 160 MG TABLET] 90 tablet 1     Sig: TAKE 1 TABLET BY MOUTH EVERY DAY

## 2024-03-25 ENCOUNTER — OFFICE VISIT (OUTPATIENT)
Facility: CLINIC | Age: 44
End: 2024-03-25
Payer: COMMERCIAL

## 2024-03-25 VITALS
OXYGEN SATURATION: 95 % | BODY MASS INDEX: 46.15 KG/M2 | HEART RATE: 114 BPM | TEMPERATURE: 98.4 F | SYSTOLIC BLOOD PRESSURE: 134 MMHG | RESPIRATION RATE: 18 BRPM | DIASTOLIC BLOOD PRESSURE: 76 MMHG | WEIGHT: 311.6 LBS | HEIGHT: 69 IN

## 2024-03-25 DIAGNOSIS — R06.2 WHEEZING: Primary | ICD-10-CM

## 2024-03-25 DIAGNOSIS — J06.9 UPPER RESPIRATORY TRACT INFECTION, UNSPECIFIED TYPE: ICD-10-CM

## 2024-03-25 DIAGNOSIS — J30.89 ENVIRONMENTAL AND SEASONAL ALLERGIES: ICD-10-CM

## 2024-03-25 PROCEDURE — 99213 OFFICE O/P EST LOW 20 MIN: CPT | Performed by: NURSE PRACTITIONER

## 2024-03-25 PROCEDURE — 3078F DIAST BP <80 MM HG: CPT | Performed by: NURSE PRACTITIONER

## 2024-03-25 PROCEDURE — 3075F SYST BP GE 130 - 139MM HG: CPT | Performed by: NURSE PRACTITIONER

## 2024-03-25 RX ORDER — METHYLPREDNISOLONE 4 MG/1
TABLET ORAL
Qty: 1 KIT | Refills: 0 | Status: SHIPPED | OUTPATIENT
Start: 2024-03-25

## 2024-03-25 ASSESSMENT — PATIENT HEALTH QUESTIONNAIRE - PHQ9
SUM OF ALL RESPONSES TO PHQ QUESTIONS 1-9: 0
2. FEELING DOWN, DEPRESSED OR HOPELESS: NOT AT ALL
SUM OF ALL RESPONSES TO PHQ9 QUESTIONS 1 & 2: 0
1. LITTLE INTEREST OR PLEASURE IN DOING THINGS: NOT AT ALL
SUM OF ALL RESPONSES TO PHQ QUESTIONS 1-9: 0

## 2024-03-25 NOTE — PROGRESS NOTES
Chief Complaint   Patient presents with    URI     Cough and wheezing started on 3/13/24       SUBJECTIVE:    Dayne Case is a 43 y.o. male who is here today with complaints of ongoing respiratory symptoms.  He states he was seen at an urgent care facility approximately a week ago where he was tested for COVID and flu.  Both of these were negative.  At that time, he had a cough which was bothering him which had progressively worsened and began wheezing a few days later.  He continues to use his albuterol inhaler as well as a benzonatate cough tablets that he was given by urgent care.  He denies any fever, shortness of breath, body aches, or chills.  He states his wheezing is worse when lying down at night to sleep.  He states his cough is nonproductive.      Current Outpatient Medications   Medication Sig Dispense Refill    methylPREDNISolone (MEDROL DOSEPACK) 4 MG tablet Per package instructions. 1 kit 0    valsartan (DIOVAN) 160 MG tablet TAKE 1 TABLET BY MOUTH EVERY DAY 90 tablet 1    metoprolol succinate (TOPROL XL) 50 MG extended release tablet Take 1 tablet by mouth daily 90 tablet 1    albuterol sulfate HFA (PROVENTIL HFA) 108 (90 Base) MCG/ACT inhaler Inhale 2 puffs into the lungs every 6 hours as needed for Wheezing 18 g 3    CVS ASPIRIN ADULT LOW DOSE 81 MG chewable tablet TAKE 1 TABLET BY MOUTH EVERY DAY 90 tablet 1    atorvastatin (LIPITOR) 10 MG tablet TAKE 1 TABLET BY MOUTH DAILY. INDICATIONS: HIGH CHOLESTEROL 90 tablet 1    albuterol sulfate HFA (PROVENTIL;VENTOLIN;PROAIR) 108 (90 Base) MCG/ACT inhaler USE 1 PUFF BY MOUTH EVERY 4 HOURS AS NEEDED FOR WHEEZING (Patient not taking: Reported on 3/25/2024) 6.7 each 2     No current facility-administered medications for this visit.     Past Medical History:   Diagnosis Date    Cigarette nicotine dependence without complication 10/18/2017    Essential hypertension 10/9/2017    Panic anxiety syndrome 10/9/2017    Recurrent kidney stones 10/9/2017

## 2024-03-25 NOTE — PROGRESS NOTES
Dayne Case is a 43 y.o. male     Chief Complaint   Patient presents with    URI     Cough and wheezing started on 3/13/24       /76 (Site: Right Upper Arm, Position: Sitting, Cuff Size: Large Adult)   Pulse (!) 114   Temp 98.4 °F (36.9 °C) (Oral)   Resp 18   Ht 1.753 m (5' 9\")   Wt (!) 141.3 kg (311 lb 9.6 oz)   SpO2 95%   BMI 46.02 kg/m²     Health Maintenance Due   Topic Date Due    Hepatitis B vaccine (1 of 3 - 3-dose series) Never done    Pneumococcal 0-64 years Vaccine (1 of 2 - PCV) Never done    DTaP/Tdap/Td vaccine (1 - Tdap) Never done    Flu vaccine (1) 08/01/2023    COVID-19 Vaccine (3 - 2023-24 season) 09/01/2023         \"Have you been to the ER, urgent care clinic since your last visit?  Hospitalized since your last visit?\"    YES - When: approximately 2  weeks ago.  Where and Why: [facility] for cough and wheezing.    “Have you seen or consulted any other health care providers outside of [facility] since your last visit?”    NO

## 2024-05-28 ENCOUNTER — OFFICE VISIT (OUTPATIENT)
Facility: CLINIC | Age: 44
End: 2024-05-28
Payer: COMMERCIAL

## 2024-05-28 VITALS
WEIGHT: 311.6 LBS | TEMPERATURE: 98.6 F | HEART RATE: 88 BPM | OXYGEN SATURATION: 95 % | SYSTOLIC BLOOD PRESSURE: 130 MMHG | DIASTOLIC BLOOD PRESSURE: 86 MMHG | BODY MASS INDEX: 46.02 KG/M2

## 2024-05-28 DIAGNOSIS — J30.89 ENVIRONMENTAL AND SEASONAL ALLERGIES: ICD-10-CM

## 2024-05-28 DIAGNOSIS — Z71.6 ENCOUNTER FOR TOBACCO USE CESSATION COUNSELING: ICD-10-CM

## 2024-05-28 DIAGNOSIS — D72.829 LEUKOCYTOSIS, UNSPECIFIED TYPE: ICD-10-CM

## 2024-05-28 DIAGNOSIS — I10 ESSENTIAL HYPERTENSION: ICD-10-CM

## 2024-05-28 DIAGNOSIS — E66.01 CLASS 3 SEVERE OBESITY DUE TO EXCESS CALORIES WITH SERIOUS COMORBIDITY AND BODY MASS INDEX (BMI) OF 45.0 TO 49.9 IN ADULT (HCC): ICD-10-CM

## 2024-05-28 DIAGNOSIS — F17.210 TOBACCO DEPENDENCE DUE TO CIGARETTES: ICD-10-CM

## 2024-05-28 DIAGNOSIS — Z00.00 ROUTINE PHYSICAL EXAMINATION: Primary | ICD-10-CM

## 2024-05-28 DIAGNOSIS — E78.00 PURE HYPERCHOLESTEROLEMIA: ICD-10-CM

## 2024-05-28 DIAGNOSIS — R09.81 NASAL CONGESTION: ICD-10-CM

## 2024-05-28 PROCEDURE — 99396 PREV VISIT EST AGE 40-64: CPT | Performed by: NURSE PRACTITIONER

## 2024-05-28 PROCEDURE — 3079F DIAST BP 80-89 MM HG: CPT | Performed by: NURSE PRACTITIONER

## 2024-05-28 PROCEDURE — 3075F SYST BP GE 130 - 139MM HG: CPT | Performed by: NURSE PRACTITIONER

## 2024-05-28 RX ORDER — FLUTICASONE PROPIONATE 50 MCG
2 SPRAY, SUSPENSION (ML) NASAL
Qty: 16 G | Refills: 5 | Status: SHIPPED | OUTPATIENT
Start: 2024-05-28

## 2024-05-28 NOTE — PROGRESS NOTES
Chief Complaint   Patient presents with    Follow-up     6 month followup physical    1. Have you been to the ER, urgent care clinic since your last visit?  Hospitalized since your last visit?no    2. Have you seen or consulted any other health care providers outside of the [facility] since your last visit?  Include any pap smears or colon screening.no

## 2024-05-28 NOTE — PROGRESS NOTES
Chief Complaint   Patient presents with    Follow-up     6 month followup physical       SUBJECTIVE:    Dayne Case is a 43 y.o. male who is here today for a routine physical exam as well as follow up appointment regarding current medical conditions including: Morbid obesity, essential hypertension, pure hypercholesterolemia, and leukocytosis on prior labs.    Patient states he is feeling relatively well overall.  He continues to use valsartan and metoprolol succinate for blood pressure management and tolerates this well.  His blood pressure has remained stable and in good control overall.  He is also on atorvastatin daily for management of his cholesterol and tolerates this well.  He denies any recent episodes of chest pain, chest pressure, shortness of breath, headaches, dizziness, blurred vision, palpitations, or syncope episodes.  He states he is trying to be watchful of his diet and exercises through regular work.    Additionally, he states he has been having a great deal of congestion especially in the mornings upon waking.  He does admit to some allergy-like symptoms on occasion.    He continues to smoke cigarettes despite repeated warnings and health risk.      Current Outpatient Medications   Medication Sig Dispense Refill    methylPREDNISolone (MEDROL DOSEPACK) 4 MG tablet Per package instructions. 1 kit 0    valsartan (DIOVAN) 160 MG tablet TAKE 1 TABLET BY MOUTH EVERY DAY 90 tablet 1    metoprolol succinate (TOPROL XL) 50 MG extended release tablet Take 1 tablet by mouth daily 90 tablet 1    albuterol sulfate HFA (PROVENTIL HFA) 108 (90 Base) MCG/ACT inhaler Inhale 2 puffs into the lungs every 6 hours as needed for Wheezing 18 g 3    CVS ASPIRIN ADULT LOW DOSE 81 MG chewable tablet TAKE 1 TABLET BY MOUTH EVERY DAY 90 tablet 1    atorvastatin (LIPITOR) 10 MG tablet TAKE 1 TABLET BY MOUTH DAILY. INDICATIONS: HIGH CHOLESTEROL 90 tablet 1     No current facility-administered medications for this

## 2024-05-29 LAB
ALBUMIN SERPL-MCNC: 4.5 G/DL (ref 4.1–5.1)
ALBUMIN/GLOB SERPL: 1.5 {RATIO} (ref 1.2–2.2)
ALP SERPL-CCNC: 117 IU/L (ref 44–121)
ALT SERPL-CCNC: 29 IU/L (ref 0–44)
APPEARANCE UR: CLEAR
AST SERPL-CCNC: 31 IU/L (ref 0–40)
BASOPHILS # BLD AUTO: 0.1 X10E3/UL (ref 0–0.2)
BASOPHILS NFR BLD AUTO: 1 %
BILIRUB SERPL-MCNC: 0.3 MG/DL (ref 0–1.2)
BILIRUB UR QL STRIP: NEGATIVE
BUN SERPL-MCNC: 8 MG/DL (ref 6–24)
BUN/CREAT SERPL: 9 (ref 9–20)
CALCIUM SERPL-MCNC: 9.8 MG/DL (ref 8.7–10.2)
CHLORIDE SERPL-SCNC: 101 MMOL/L (ref 96–106)
CO2 SERPL-SCNC: 21 MMOL/L (ref 20–29)
COLOR UR: YELLOW
CREAT SERPL-MCNC: 0.87 MG/DL (ref 0.76–1.27)
EGFRCR SERPLBLD CKD-EPI 2021: 109 ML/MIN/1.73
EOSINOPHIL # BLD AUTO: 0.2 X10E3/UL (ref 0–0.4)
EOSINOPHIL NFR BLD AUTO: 2 %
ERYTHROCYTE [DISTWIDTH] IN BLOOD BY AUTOMATED COUNT: 13.4 % (ref 11.6–15.4)
GLOBULIN SER CALC-MCNC: 3.1 G/DL (ref 1.5–4.5)
GLUCOSE SERPL-MCNC: 96 MG/DL (ref 70–99)
GLUCOSE UR QL STRIP: NEGATIVE
HCT VFR BLD AUTO: 49.2 % (ref 37.5–51)
HGB BLD-MCNC: 16.4 G/DL (ref 13–17.7)
HGB UR QL STRIP: NEGATIVE
IMM GRANULOCYTES # BLD AUTO: 0.1 X10E3/UL (ref 0–0.1)
IMM GRANULOCYTES NFR BLD AUTO: 1 %
KETONES UR QL STRIP: NEGATIVE
LEUKOCYTE ESTERASE UR QL STRIP: NEGATIVE
LYMPHOCYTES # BLD AUTO: 2.9 X10E3/UL (ref 0.7–3.1)
LYMPHOCYTES NFR BLD AUTO: 22 %
MCH RBC QN AUTO: 31.6 PG (ref 26.6–33)
MCHC RBC AUTO-ENTMCNC: 33.3 G/DL (ref 31.5–35.7)
MCV RBC AUTO: 95 FL (ref 79–97)
MONOCYTES # BLD AUTO: 1 X10E3/UL (ref 0.1–0.9)
MONOCYTES NFR BLD AUTO: 8 %
NEUTROPHILS # BLD AUTO: 8.8 X10E3/UL (ref 1.4–7)
NEUTROPHILS NFR BLD AUTO: 66 %
NITRITE UR QL STRIP: NEGATIVE
PH UR STRIP: 7 [PH] (ref 5–7.5)
PLATELET # BLD AUTO: 307 X10E3/UL (ref 150–450)
POTASSIUM SERPL-SCNC: 5 MMOL/L (ref 3.5–5.2)
PROT SERPL-MCNC: 7.6 G/DL (ref 6–8.5)
PROT UR QL STRIP: NEGATIVE
RBC # BLD AUTO: 5.19 X10E6/UL (ref 4.14–5.8)
SODIUM SERPL-SCNC: 139 MMOL/L (ref 134–144)
SP GR UR STRIP: 1.02 (ref 1–1.03)
TSH SERPL DL<=0.005 MIU/L-ACNC: 0.73 UIU/ML (ref 0.45–4.5)
UROBILINOGEN UR STRIP-MCNC: 0.2 MG/DL (ref 0.2–1)
WBC # BLD AUTO: 13 X10E3/UL (ref 3.4–10.8)

## 2024-05-30 LAB
CHOLEST SERPL-MCNC: 341 MG/DL (ref 100–199)
HDLC SERPL-MCNC: 54 MG/DL
LABORATORY COMMENT REPORT: ABNORMAL
LDLC SERPL CALC-MCNC: 225 MG/DL (ref 0–99)
TRIGL SERPL-MCNC: 301 MG/DL (ref 0–149)
VLDLC SERPL CALC-MCNC: 62 MG/DL (ref 5–40)

## 2024-07-24 NOTE — TELEPHONE ENCOUNTER
----- Message from NOE Hui NP sent at 5/18/2023 12:52 PM EDT -----  Regarding: RE: blood work results   Contact: [phone]  Can we see what the hold-up is on his CBC with differential?  It looks like this may have been sent out to [facility] for some reason.     ~SDG  ----- Message -----  From: Natalie Brown  Sent: 5/18/2023  11:11 AM EDT  To: NOE Hui NP  Subject: FW: blood work results                             ----- Message -----  From: Dusty Russo  Sent: 5/18/2023   6:38 AM EDT  To: , #  Subject: blood work results                               please advise on the status of the blood work that i did on 5.9.23. still have not heard anything, nor see anything on the [facility] portal.
Spoke to patient and informed him that we called [facility] and they stated there was a delay in processing the results and we should have the result early next week.
helmet

## 2024-12-03 ENCOUNTER — OFFICE VISIT (OUTPATIENT)
Facility: CLINIC | Age: 44
End: 2024-12-03

## 2024-12-03 VITALS
DIASTOLIC BLOOD PRESSURE: 80 MMHG | HEIGHT: 69 IN | TEMPERATURE: 98 F | WEIGHT: 299.8 LBS | OXYGEN SATURATION: 98 % | HEART RATE: 77 BPM | RESPIRATION RATE: 16 BRPM | SYSTOLIC BLOOD PRESSURE: 126 MMHG | BODY MASS INDEX: 44.4 KG/M2

## 2024-12-03 DIAGNOSIS — E66.813 CLASS 3 SEVERE OBESITY DUE TO EXCESS CALORIES WITH SERIOUS COMORBIDITY AND BODY MASS INDEX (BMI) OF 40.0 TO 44.9 IN ADULT: ICD-10-CM

## 2024-12-03 DIAGNOSIS — R06.2 WHEEZING: ICD-10-CM

## 2024-12-03 DIAGNOSIS — Z12.11 SCREEN FOR COLON CANCER: ICD-10-CM

## 2024-12-03 DIAGNOSIS — Z23 NEEDS FLU SHOT: ICD-10-CM

## 2024-12-03 DIAGNOSIS — D72.829 LEUKOCYTOSIS, UNSPECIFIED TYPE: ICD-10-CM

## 2024-12-03 DIAGNOSIS — E66.01 CLASS 3 SEVERE OBESITY DUE TO EXCESS CALORIES WITH SERIOUS COMORBIDITY AND BODY MASS INDEX (BMI) OF 40.0 TO 44.9 IN ADULT: ICD-10-CM

## 2024-12-03 DIAGNOSIS — I10 ESSENTIAL HYPERTENSION: Primary | ICD-10-CM

## 2024-12-03 DIAGNOSIS — F43.0 STRESS REACTION: ICD-10-CM

## 2024-12-03 DIAGNOSIS — R41.840 DIFFICULTY CONCENTRATING: ICD-10-CM

## 2024-12-03 DIAGNOSIS — E78.00 PURE HYPERCHOLESTEROLEMIA: ICD-10-CM

## 2024-12-03 RX ORDER — METOPROLOL SUCCINATE 50 MG/1
50 TABLET, EXTENDED RELEASE ORAL DAILY
Qty: 90 TABLET | Refills: 1 | Status: SHIPPED | OUTPATIENT
Start: 2024-12-03

## 2024-12-03 RX ORDER — BUPROPION HYDROCHLORIDE 150 MG/1
150 TABLET ORAL EVERY MORNING
Qty: 90 TABLET | Refills: 0 | Status: SHIPPED | OUTPATIENT
Start: 2024-12-03

## 2024-12-03 RX ORDER — ALBUTEROL SULFATE 90 UG/1
2 INHALANT RESPIRATORY (INHALATION) EVERY 6 HOURS PRN
Qty: 18 G | Refills: 3 | Status: SHIPPED | OUTPATIENT
Start: 2024-12-03

## 2024-12-03 SDOH — ECONOMIC STABILITY: FOOD INSECURITY: WITHIN THE PAST 12 MONTHS, THE FOOD YOU BOUGHT JUST DIDN'T LAST AND YOU DIDN'T HAVE MONEY TO GET MORE.: NEVER TRUE

## 2024-12-03 SDOH — ECONOMIC STABILITY: INCOME INSECURITY: HOW HARD IS IT FOR YOU TO PAY FOR THE VERY BASICS LIKE FOOD, HOUSING, MEDICAL CARE, AND HEATING?: NOT HARD AT ALL

## 2024-12-03 SDOH — ECONOMIC STABILITY: FOOD INSECURITY: WITHIN THE PAST 12 MONTHS, YOU WORRIED THAT YOUR FOOD WOULD RUN OUT BEFORE YOU GOT MONEY TO BUY MORE.: NEVER TRUE

## 2024-12-03 NOTE — PROGRESS NOTES
Dayne Case is a 44 y.o. male     Chief Complaint   Patient presents with    Hypertension     5M       /80 (Site: Left Upper Arm, Position: Sitting, Cuff Size: Medium Adult)   Pulse 77   Temp 98 °F (36.7 °C) (Oral)   Resp 16   Ht 1.753 m (5' 9\")   Wt 136 kg (299 lb 12.8 oz)   SpO2 98%   BMI 44.27 kg/m²     Health Maintenance Due   Topic Date Due    Pneumococcal 0-64 years Vaccine (1 of 2 - PCV) Never done    Hepatitis B vaccine (1 of 3 - 19+ 3-dose series) Never done    DTaP/Tdap/Td vaccine (1 - Tdap) Never done    Colorectal Cancer Screen  Never done    Flu vaccine (1) 08/01/2024    COVID-19 Vaccine (3 - 2023-24 season) 09/01/2024         \"Have you been to the ER, urgent care clinic since your last visit?  Hospitalized since your last visit?\"    NO    “Have you seen or consulted any other health care providers outside of [facility] since your last visit?”    NO    “Have you had a colorectal cancer screening such as a colonoscopy/FIT/Cologuard?    NO    No colonoscopy on file  No cologuard on file  No FIT/FOBT on file   No flexible sigmoidoscopy on file     Administered influenza vaccine in left deltoid patient tolerated injection well.    Lot#:[id number]    Exp:6/17/2025    NDC:51924-359-54

## 2024-12-03 NOTE — PROGRESS NOTES
Chief Complaint   Patient presents with    Hypertension     5M       SUBJECTIVE:    Dayne Case is a 44 y.o. male who is here today for a follow up appointment regarding current medical conditions including: Essential hypertension, pure hypercholesterolemia, leukocytosis on prior labs, severe obesity, and occasional wheezing.  He would also like to discuss issues with stress as well as difficulty concentrating and distractibility that has been ongoing for the past several months.  He is fasting today.  He is requesting a flu shot today.    The patient continues to manage his hypertension with metoprolol succinate and valsartan daily.  His blood pressures have remained stable and in good control overall.  He denies any adverse side effects of the medication.  He is currently on atorvastatin daily for management of his cholesterol and tolerates this well.  He states he has been working on healthy dietary intake, reduce calories, and increase exercise.  He has lost approximately 12 pounds since his last encounter.    More recently, the patient states he has been having a lot of stress.  He states he has difficulty concentrating at work, has a high degree of distractibility, and has difficulty completing tasks in a timely manner.  He states he had been on medication for this in the past, and would be agreeable to starting something to help with his issues.      Current Outpatient Medications   Medication Sig Dispense Refill    albuterol sulfate HFA (PROVENTIL HFA) 108 (90 Base) MCG/ACT inhaler Inhale 2 puffs into the lungs every 6 hours as needed for Wheezing 18 g 3    buPROPion (WELLBUTRIN XL) 150 MG extended release tablet Take 1 tablet by mouth every morning 90 tablet 0    fluticasone (FLONASE) 50 MCG/ACT nasal spray 2 sprays by Each Nostril route nightly 16 g 5    valsartan (DIOVAN) 160 MG tablet TAKE 1 TABLET BY MOUTH EVERY DAY 90 tablet 1    [facility] ASPIRIN ADULT LOW DOSE 81 MG chewable tablet TAKE 1 TABLET BY

## 2024-12-04 LAB
ALBUMIN SERPL-MCNC: 4.1 G/DL (ref 3.5–5)
ALBUMIN/GLOB SERPL: 1.1 (ref 1.1–2.2)
ALP SERPL-CCNC: 134 U/L (ref 45–117)
ALT SERPL-CCNC: 40 U/L (ref 12–78)
ANION GAP SERPL CALC-SCNC: 7 MMOL/L (ref 2–12)
AST SERPL-CCNC: 28 U/L (ref 15–37)
BASOPHILS # BLD: 0.1 K/UL (ref 0–0.1)
BASOPHILS NFR BLD: 1 % (ref 0–1)
BILIRUB SERPL-MCNC: 0.3 MG/DL (ref 0.2–1)
BUN SERPL-MCNC: 9 MG/DL (ref 6–20)
BUN/CREAT SERPL: 10 (ref 12–20)
CALCIUM SERPL-MCNC: 9.5 MG/DL (ref 8.5–10.1)
CHLORIDE SERPL-SCNC: 99 MMOL/L (ref 97–108)
CHOLEST SERPL-MCNC: 251 MG/DL
CO2 SERPL-SCNC: 31 MMOL/L (ref 21–32)
CREAT SERPL-MCNC: 0.93 MG/DL (ref 0.7–1.3)
DIFFERENTIAL METHOD BLD: ABNORMAL
EOSINOPHIL # BLD: 0.3 K/UL (ref 0–0.4)
EOSINOPHIL NFR BLD: 1 % (ref 0–7)
ERYTHROCYTE [DISTWIDTH] IN BLOOD BY AUTOMATED COUNT: 12.3 % (ref 11.5–14.5)
GLOBULIN SER CALC-MCNC: 3.8 G/DL (ref 2–4)
GLUCOSE SERPL-MCNC: 92 MG/DL (ref 65–100)
HCT VFR BLD AUTO: 49.4 % (ref 36.6–50.3)
HDLC SERPL-MCNC: 64 MG/DL
HDLC SERPL: 3.9 (ref 0–5)
HGB BLD-MCNC: 16.5 G/DL (ref 12.1–17)
IMM GRANULOCYTES # BLD AUTO: 0.1 K/UL (ref 0–0.04)
IMM GRANULOCYTES NFR BLD AUTO: 1 % (ref 0–0.5)
LDLC SERPL CALC-MCNC: 143.4 MG/DL (ref 0–100)
LYMPHOCYTES # BLD: 3.3 K/UL (ref 0.8–3.5)
LYMPHOCYTES NFR BLD: 18 % (ref 12–49)
MCH RBC QN AUTO: 32.3 PG (ref 26–34)
MCHC RBC AUTO-ENTMCNC: 33.4 G/DL (ref 30–36.5)
MCV RBC AUTO: 96.7 FL (ref 80–99)
MONOCYTES # BLD: 1.1 K/UL (ref 0–1)
MONOCYTES NFR BLD: 6 % (ref 5–13)
NEUTS SEG # BLD: 13.3 K/UL (ref 1.8–8)
NEUTS SEG NFR BLD: 73 % (ref 32–75)
NRBC # BLD: 0 K/UL (ref 0–0.01)
NRBC BLD-RTO: 0 PER 100 WBC
PLATELET # BLD AUTO: 349 K/UL (ref 150–400)
PMV BLD AUTO: 10.8 FL (ref 8.9–12.9)
POTASSIUM SERPL-SCNC: 4.8 MMOL/L (ref 3.5–5.1)
PROT SERPL-MCNC: 7.9 G/DL (ref 6.4–8.2)
RBC # BLD AUTO: 5.11 M/UL (ref 4.1–5.7)
SODIUM SERPL-SCNC: 137 MMOL/L (ref 136–145)
TRIGL SERPL-MCNC: 218 MG/DL
VLDLC SERPL CALC-MCNC: 43.6 MG/DL
WBC # BLD AUTO: 18.2 K/UL (ref 4.1–11.1)

## 2024-12-05 RX ORDER — ATORVASTATIN CALCIUM 20 MG/1
20 TABLET, FILM COATED ORAL DAILY
Qty: 90 TABLET | Refills: 1 | Status: SHIPPED | OUTPATIENT
Start: 2024-12-05

## 2025-01-15 ENCOUNTER — OFFICE VISIT (OUTPATIENT)
Facility: CLINIC | Age: 45
End: 2025-01-15
Payer: COMMERCIAL

## 2025-01-15 VITALS
WEIGHT: 298.8 LBS | TEMPERATURE: 97.9 F | BODY MASS INDEX: 44.25 KG/M2 | OXYGEN SATURATION: 98 % | RESPIRATION RATE: 18 BRPM | DIASTOLIC BLOOD PRESSURE: 80 MMHG | HEART RATE: 87 BPM | HEIGHT: 69 IN | SYSTOLIC BLOOD PRESSURE: 120 MMHG

## 2025-01-15 DIAGNOSIS — F41.9 ANXIETY: ICD-10-CM

## 2025-01-15 DIAGNOSIS — F90.0 ADHD, PREDOMINANTLY INATTENTIVE TYPE: Primary | ICD-10-CM

## 2025-01-15 DIAGNOSIS — Z56.6 STRESS AT WORK: ICD-10-CM

## 2025-01-15 PROCEDURE — 3079F DIAST BP 80-89 MM HG: CPT | Performed by: NURSE PRACTITIONER

## 2025-01-15 PROCEDURE — 99214 OFFICE O/P EST MOD 30 MIN: CPT | Performed by: NURSE PRACTITIONER

## 2025-01-15 PROCEDURE — 3074F SYST BP LT 130 MM HG: CPT | Performed by: NURSE PRACTITIONER

## 2025-01-15 SDOH — ECONOMIC STABILITY: INCOME INSECURITY: IN THE LAST 12 MONTHS, WAS THERE A TIME WHEN YOU WERE NOT ABLE TO PAY THE MORTGAGE OR RENT ON TIME?: NO

## 2025-01-15 SDOH — ECONOMIC STABILITY: FOOD INSECURITY: WITHIN THE PAST 12 MONTHS, YOU WORRIED THAT YOUR FOOD WOULD RUN OUT BEFORE YOU GOT MONEY TO BUY MORE.: NEVER TRUE

## 2025-01-15 SDOH — ECONOMIC STABILITY: TRANSPORTATION INSECURITY
IN THE PAST 12 MONTHS, HAS THE LACK OF TRANSPORTATION KEPT YOU FROM MEDICAL APPOINTMENTS OR FROM GETTING MEDICATIONS?: NO

## 2025-01-15 SDOH — HEALTH STABILITY - MENTAL HEALTH: OTHER PHYSICAL AND MENTAL STRAIN RELATED TO WORK: Z56.6

## 2025-01-15 SDOH — ECONOMIC STABILITY: FOOD INSECURITY: WITHIN THE PAST 12 MONTHS, THE FOOD YOU BOUGHT JUST DIDN'T LAST AND YOU DIDN'T HAVE MONEY TO GET MORE.: NEVER TRUE

## 2025-01-15 NOTE — PROGRESS NOTES
\"Have you been to the ER, urgent care clinic since your last visit?  Hospitalized since your last visit?\"    NO    “Have you seen or consulted any other health care providers outside our system since your last visit?”    NO      “Have you had a colorectal cancer screening such as a colonoscopy/FIT/Cologuard?    NO    No colonoscopy on file  No cologuard on file  No FIT/FOBT on file   No flexible sigmoidoscopy on file           
Alcohol use: Yes     Social Determinants of Health     Financial Resource Strain: Low Risk  (12/3/2024)    Overall Financial Resource Strain (CARDIA)     Difficulty of Paying Living Expenses: Not hard at all   Transportation Needs: Unknown (12/3/2024)    PRAPARE - Transportation     Lack of Transportation (Non-Medical): No   Housing Stability: Unknown (1/15/2025)    Housing Stability Vital Sign     Number of Times Moved in the Last Year: 0     Homeless in the Last Year: No     Family History   Problem Relation Age of Onset    Heart Disease Father     Cancer Father        OBJECTIVE:     /80   Pulse 87   Temp 97.9 °F (36.6 °C)   Resp 18   Ht 1.753 m (5' 9.02\")   Wt 135.5 kg (298 lb 12.8 oz)   SpO2 98%   BMI 44.10 kg/m²     Constitutional: He appears well nourished, of stated age, and dressed appropriately.  Eyes: Sclera anicteric, PERRLA, EOMI  Neck: Supple without lymphadenopathy.   Respiratory: Clear to ascultation X5, normal inspiratory effort, no adventitious breath sounds.  Cardiovascular: Regular rate and rhythm, no rubs or gallops, PMI not displaced, No thrills  Neuro: Non-focal exam, A & O X 3.   Psychiatric: Appropriate affect and demeanor, pleasant and cooperative. Patient's thought content and thought processing appear to be within normal limits.    ASSESSMENT/PLAN:    Diagnosis Orders   1. ADHD, predominantly inattentive type  ToxAssure Select 13    ToxAssure Select 13      2. Stress at work        3. Anxiety          1: Patient was reviewed today using DSM-5 TR manual for symptoms of ADHD.  Patient needs 6 out of 9 criteria for inattentive type, but does not meet criteria for hyperactivity or impulsivity.  2: I have advised patient that I believe he would likely benefit from a more traditional medication such as Adderall for treatment of his symptoms of ADHD.  3: Patient may continue Wellbutrin  mg for now due to benefit of smoking cessation.  We will consider discontinuation later.  4:

## 2025-01-19 LAB — DRUGS UR: NORMAL

## 2025-02-08 DIAGNOSIS — F43.0 STRESS REACTION: ICD-10-CM

## 2025-02-08 DIAGNOSIS — R41.840 DIFFICULTY CONCENTRATING: ICD-10-CM

## 2025-02-10 RX ORDER — BUPROPION HYDROCHLORIDE 150 MG/1
150 TABLET ORAL EVERY MORNING
Qty: 90 TABLET | Refills: 1 | Status: SHIPPED | OUTPATIENT
Start: 2025-02-10

## 2025-02-10 NOTE — TELEPHONE ENCOUNTER
PCP: Félix Gonzalez APRN - NP    Last appt: 1/15/2025    Future Appointments   Date Time Provider Department Center   2/27/2025  3:30 PM Félix Gonzalez APRN - NP [facility]       Requested Prescriptions     Pending Prescriptions Disp Refills    buPROPion (WELLBUTRIN XL) 150 MG extended release tablet 90 tablet 1     Sig: Take 1 tablet by mouth every morning

## 2025-02-14 ENCOUNTER — PATIENT MESSAGE (OUTPATIENT)
Facility: CLINIC | Age: 45
End: 2025-02-14

## 2025-02-14 DIAGNOSIS — F90.0 ADHD, PREDOMINANTLY INATTENTIVE TYPE: Primary | ICD-10-CM

## 2025-02-17 RX ORDER — DEXTROAMPHETAMINE SACCHARATE, AMPHETAMINE ASPARTATE MONOHYDRATE, DEXTROAMPHETAMINE SULFATE AND AMPHETAMINE SULFATE 5; 5; 5; 5 MG/1; MG/1; MG/1; MG/1
20 CAPSULE, EXTENDED RELEASE ORAL EVERY MORNING
Qty: 30 CAPSULE | Refills: 0 | Status: SHIPPED | OUTPATIENT
Start: 2025-02-17 | End: 2025-03-19

## 2025-02-27 ENCOUNTER — OFFICE VISIT (OUTPATIENT)
Facility: CLINIC | Age: 45
End: 2025-02-27
Payer: COMMERCIAL

## 2025-02-27 VITALS
WEIGHT: 305.6 LBS | SYSTOLIC BLOOD PRESSURE: 126 MMHG | OXYGEN SATURATION: 100 % | RESPIRATION RATE: 16 BRPM | DIASTOLIC BLOOD PRESSURE: 80 MMHG | BODY MASS INDEX: 45.26 KG/M2 | HEART RATE: 99 BPM | HEIGHT: 69 IN | TEMPERATURE: 98.5 F

## 2025-02-27 DIAGNOSIS — F90.0 ADHD, PREDOMINANTLY INATTENTIVE TYPE: Primary | ICD-10-CM

## 2025-02-27 DIAGNOSIS — Z79.899 LONG-TERM CURRENT USE OF STIMULANT: ICD-10-CM

## 2025-02-27 PROCEDURE — 3079F DIAST BP 80-89 MM HG: CPT | Performed by: NURSE PRACTITIONER

## 2025-02-27 PROCEDURE — 3074F SYST BP LT 130 MM HG: CPT | Performed by: NURSE PRACTITIONER

## 2025-02-27 PROCEDURE — 99213 OFFICE O/P EST LOW 20 MIN: CPT | Performed by: NURSE PRACTITIONER

## 2025-02-27 ASSESSMENT — PATIENT HEALTH QUESTIONNAIRE - PHQ9
SUM OF ALL RESPONSES TO PHQ QUESTIONS 1-9: 0
SUM OF ALL RESPONSES TO PHQ QUESTIONS 1-9: 0
SUM OF ALL RESPONSES TO PHQ9 QUESTIONS 1 & 2: 0
SUM OF ALL RESPONSES TO PHQ QUESTIONS 1-9: 0
SUM OF ALL RESPONSES TO PHQ QUESTIONS 1-9: 0
2. FEELING DOWN, DEPRESSED OR HOPELESS: NOT AT ALL
1. LITTLE INTEREST OR PLEASURE IN DOING THINGS: NOT AT ALL

## 2025-02-27 NOTE — PROGRESS NOTES
Chief Complaint   Patient presents with    Medication Check     5 wk [facility]       SUBJECTIVE:    Dayne Case is a 44 y.o. male who is here today for a follow up appointment regarding his ADHD, predominantly inattentive type, and current use of stimulant based medication.    The patient was previously seen on 01/15/2025 with complaints of difficulty completing tasks and high stress issues at work.  At that time, he discussed treatment of ADHD in his younger years and thought maybe it was something that he needed to resume.  He was screened via DSM-5 TR and met criteria for ADHD, predominantly inattentive type.  The patient had a urine drug screen done and was subsequently started on Adderall XR 15 mg daily.  He took the medication for the duration prescribed and as directed.  He states initially the medication seemed to improve his focus, decrease his distractibility, and felt that he was able to complete tasks in a more timely manner.  However, after a couple of weeks, he has began to notice a decline in its effectiveness.  At time for his refill, he requested to increase the dose to see if this would work better for him.  He was then increased to 20 mg daily which he feels has been a bit more beneficial, but does have a wearing off effect after about 4 hours or so.  He denies any adverse side effects of the medication and has had no recent episodes of chest pain, chest pressure, shortness of breath, headaches, dizziness, blurred vision, palpitations, or syncope episodes.      Current Outpatient Medications   Medication Sig Dispense Refill    amphetamine-dextroamphetamine (ADDERALL XR) 20 MG extended release capsule Take 1 capsule by mouth every morning for 30 days. Max Daily Amount: 20 mg 30 capsule 0    buPROPion (WELLBUTRIN XL) 150 MG extended release tablet Take 1 tablet by mouth every morning 90 tablet 1    atorvastatin (LIPITOR) 20 MG tablet Take 1 tablet by mouth daily 90 tablet 1    albuterol

## 2025-02-27 NOTE — PROGRESS NOTES
Dayne Case is a 44 y.o. male     Chief Complaint   Patient presents with    Medication Check     6 wk med eval       /80 (Site: Left Upper Arm, Position: Sitting, Cuff Size: Medium Adult)   Pulse 99   Temp 98.5 °F (36.9 °C) (Oral)   Resp 16   Ht 1.753 m (5' 9.02\")   Wt (!) 138.6 kg (305 lb 9.6 oz)   SpO2 100%   BMI 45.10 kg/m²     Health Maintenance Due   Topic Date Due    Hepatitis B vaccine (1 of 3 - 19+ 3-dose series) Never done    DTaP/Tdap/Td vaccine (1 - Tdap) Never done    Pneumococcal 0-49 years Vaccine (1 of 2 - PCV) Never done    COVID-19 Vaccine (3 - 2024-25 season) 09/01/2024    Depression Screen  03/25/2025         \"Have you been to the ER, urgent care clinic since your last visit?  Hospitalized since your last visit?\"    NO    “Have you seen or consulted any other health care providers outside of [facility] since your last visit?”    NO

## 2025-03-09 ENCOUNTER — PATIENT MESSAGE (OUTPATIENT)
Facility: CLINIC | Age: 45
End: 2025-03-09

## 2025-03-09 DIAGNOSIS — F90.0 ADHD, PREDOMINANTLY INATTENTIVE TYPE: Primary | ICD-10-CM

## 2025-03-10 RX ORDER — DEXTROAMPHETAMINE SACCHARATE, AMPHETAMINE ASPARTATE, DEXTROAMPHETAMINE SULFATE AND AMPHETAMINE SULFATE 2.5; 2.5; 2.5; 2.5 MG/1; MG/1; MG/1; MG/1
TABLET ORAL
Qty: 30 TABLET | Refills: 0 | Status: SHIPPED | OUTPATIENT
Start: 2025-03-10 | End: 2025-04-10

## 2025-03-10 RX ORDER — DEXTROAMPHETAMINE SACCHARATE, AMPHETAMINE ASPARTATE MONOHYDRATE, DEXTROAMPHETAMINE SULFATE AND AMPHETAMINE SULFATE 7.5; 7.5; 7.5; 7.5 MG/1; MG/1; MG/1; MG/1
30 CAPSULE, EXTENDED RELEASE ORAL EVERY MORNING
Qty: 30 CAPSULE | Refills: 0 | Status: SHIPPED | OUTPATIENT
Start: 2025-03-10 | End: 2025-04-09

## 2025-03-26 ENCOUNTER — PATIENT MESSAGE (OUTPATIENT)
Facility: CLINIC | Age: 45
End: 2025-03-26

## 2025-03-26 DIAGNOSIS — F90.0 ADHD, PREDOMINANTLY INATTENTIVE TYPE: ICD-10-CM

## 2025-03-26 RX ORDER — DEXTROAMPHETAMINE SACCHARATE, AMPHETAMINE ASPARTATE MONOHYDRATE, DEXTROAMPHETAMINE SULFATE AND AMPHETAMINE SULFATE 7.5; 7.5; 7.5; 7.5 MG/1; MG/1; MG/1; MG/1
30 CAPSULE, EXTENDED RELEASE ORAL EVERY MORNING
Qty: 30 CAPSULE | Refills: 0 | Status: SHIPPED | OUTPATIENT
Start: 2025-03-26 | End: 2025-04-25

## 2025-03-26 RX ORDER — DEXTROAMPHETAMINE SACCHARATE, AMPHETAMINE ASPARTATE, DEXTROAMPHETAMINE SULFATE AND AMPHETAMINE SULFATE 2.5; 2.5; 2.5; 2.5 MG/1; MG/1; MG/1; MG/1
TABLET ORAL
Qty: 30 TABLET | Refills: 0 | Status: SHIPPED | OUTPATIENT
Start: 2025-03-26 | End: 2025-04-26

## 2025-04-28 DIAGNOSIS — F90.0 ADHD, PREDOMINANTLY INATTENTIVE TYPE: ICD-10-CM

## 2025-04-28 RX ORDER — DEXTROAMPHETAMINE SACCHARATE, AMPHETAMINE ASPARTATE MONOHYDRATE, DEXTROAMPHETAMINE SULFATE AND AMPHETAMINE SULFATE 7.5; 7.5; 7.5; 7.5 MG/1; MG/1; MG/1; MG/1
30 CAPSULE, EXTENDED RELEASE ORAL EVERY MORNING
Qty: 30 CAPSULE | Refills: 0 | Status: SHIPPED | OUTPATIENT
Start: 2025-04-28 | End: 2025-05-28

## 2025-04-28 RX ORDER — DEXTROAMPHETAMINE SACCHARATE, AMPHETAMINE ASPARTATE, DEXTROAMPHETAMINE SULFATE AND AMPHETAMINE SULFATE 2.5; 2.5; 2.5; 2.5 MG/1; MG/1; MG/1; MG/1
TABLET ORAL
Qty: 30 TABLET | Refills: 0 | Status: SHIPPED | OUTPATIENT
Start: 2025-04-28 | End: 2025-05-29

## 2025-04-28 NOTE — TELEPHONE ENCOUNTER
PCP: Félix Gonzalez APRN - NP    Last appt: 2/27/2025    Future Appointments   Date Time Provider Department Center   6/27/2025  3:30 PM Félix Gonzalez APRN - NP [facility] DE[facility]       Requested Prescriptions     Pending Prescriptions Disp Refills    amphetamine-dextroamphetamine (ADDERALL XR) 30 MG extended release capsule 30 capsule 0     Sig: Take 1 capsule by mouth every morning for 30 days. Max Daily Amount: 30 mg    amphetamine-dextroamphetamine (ADDERALL, 10MG,) 10 MG tablet 30 tablet 0     Sig: Take 1 tablet by mouth after lunch each day.

## 2025-05-27 DIAGNOSIS — R41.840 DIFFICULTY CONCENTRATING: ICD-10-CM

## 2025-05-27 DIAGNOSIS — F90.0 ADHD, PREDOMINANTLY INATTENTIVE TYPE: ICD-10-CM

## 2025-05-27 DIAGNOSIS — I10 ESSENTIAL (PRIMARY) HYPERTENSION: ICD-10-CM

## 2025-05-27 DIAGNOSIS — F43.0 STRESS REACTION: ICD-10-CM

## 2025-05-27 RX ORDER — DEXTROAMPHETAMINE SACCHARATE, AMPHETAMINE ASPARTATE MONOHYDRATE, DEXTROAMPHETAMINE SULFATE AND AMPHETAMINE SULFATE 7.5; 7.5; 7.5; 7.5 MG/1; MG/1; MG/1; MG/1
30 CAPSULE, EXTENDED RELEASE ORAL EVERY MORNING
Qty: 30 CAPSULE | Refills: 0 | Status: SHIPPED | OUTPATIENT
Start: 2025-05-27 | End: 2025-06-26

## 2025-05-27 RX ORDER — DEXTROAMPHETAMINE SACCHARATE, AMPHETAMINE ASPARTATE, DEXTROAMPHETAMINE SULFATE AND AMPHETAMINE SULFATE 2.5; 2.5; 2.5; 2.5 MG/1; MG/1; MG/1; MG/1
TABLET ORAL
Qty: 30 TABLET | Refills: 0 | Status: SHIPPED | OUTPATIENT
Start: 2025-05-27 | End: 2025-06-27

## 2025-05-27 RX ORDER — BUPROPION HYDROCHLORIDE 150 MG/1
150 TABLET ORAL EVERY MORNING
Qty: 90 TABLET | Refills: 1 | Status: SHIPPED | OUTPATIENT
Start: 2025-05-27

## 2025-05-27 RX ORDER — VALSARTAN 160 MG/1
160 TABLET ORAL DAILY
Qty: 90 TABLET | Refills: 1 | Status: SHIPPED | OUTPATIENT
Start: 2025-05-27

## 2025-05-27 RX ORDER — ASPIRIN 81 MG/1
81 TABLET, CHEWABLE ORAL DAILY
Qty: 90 TABLET | Refills: 1 | Status: SHIPPED | OUTPATIENT
Start: 2025-05-27

## 2025-05-27 NOTE — TELEPHONE ENCOUNTER
PCP: Félix Gonzalez APRN - NP    Last appt: 2/27/2025    Future Appointments   Date Time Provider Department Center   6/27/2025  3:30 PM Félix Gonzalez APRN - NP [facility] DE[facility]       Requested Prescriptions     Pending Prescriptions Disp Refills    aspirin ([facility] ASPIRIN ADULT LOW DOSE) 81 MG chewable tablet 90 tablet 1     Sig: Take 1 tablet by mouth daily    valsartan (DIOVAN) 160 MG tablet 90 tablet 1     Sig: Take 1 tablet by mouth daily    buPROPion (WELLBUTRIN XL) 150 MG extended release tablet 90 tablet 1     Sig: Take 1 tablet by mouth every morning    amphetamine-dextroamphetamine (ADDERALL XR) 30 MG extended release capsule 30 capsule 0     Sig: Take 1 capsule by mouth every morning for 30 days. Max Daily Amount: 30 mg    amphetamine-dextroamphetamine (ADDERALL, 10MG,) 10 MG tablet 30 tablet 0     Sig: Take 1 tablet by mouth after lunch each day.

## 2025-06-05 DIAGNOSIS — I10 ESSENTIAL HYPERTENSION: ICD-10-CM

## 2025-06-05 RX ORDER — METOPROLOL SUCCINATE 50 MG/1
50 TABLET, EXTENDED RELEASE ORAL DAILY
Qty: 90 TABLET | Refills: 1 | Status: SHIPPED | OUTPATIENT
Start: 2025-06-05

## 2025-06-05 NOTE — TELEPHONE ENCOUNTER
PCP: Félix Gonzalez APRN - NP    Last appt: 2/27/2025    Future Appointments   Date Time Provider Department Center   6/27/2025  3:30 PM Félix Gonzalez APRN - NP [facility]       Requested Prescriptions     Pending Prescriptions Disp Refills    metoprolol succinate (TOPROL XL) 50 MG extended release tablet [Pharmacy Med Name: METOPROLOL SUCC ER 50 MG TAB] 90 tablet 1     Sig: TAKE 1 TABLET BY MOUTH EVERY DAY

## 2025-06-21 DIAGNOSIS — E78.00 PURE HYPERCHOLESTEROLEMIA: ICD-10-CM

## 2025-06-23 DIAGNOSIS — F90.0 ADHD, PREDOMINANTLY INATTENTIVE TYPE: ICD-10-CM

## 2025-06-23 RX ORDER — DEXTROAMPHETAMINE SACCHARATE, AMPHETAMINE ASPARTATE, DEXTROAMPHETAMINE SULFATE AND AMPHETAMINE SULFATE 2.5; 2.5; 2.5; 2.5 MG/1; MG/1; MG/1; MG/1
TABLET ORAL
Qty: 30 TABLET | Refills: 0 | Status: SHIPPED | OUTPATIENT
Start: 2025-06-23 | End: 2025-07-24

## 2025-06-23 RX ORDER — DEXTROAMPHETAMINE SACCHARATE, AMPHETAMINE ASPARTATE MONOHYDRATE, DEXTROAMPHETAMINE SULFATE AND AMPHETAMINE SULFATE 7.5; 7.5; 7.5; 7.5 MG/1; MG/1; MG/1; MG/1
30 CAPSULE, EXTENDED RELEASE ORAL EVERY MORNING
Qty: 30 CAPSULE | Refills: 0 | Status: SHIPPED | OUTPATIENT
Start: 2025-06-23 | End: 2025-07-23

## 2025-06-23 RX ORDER — ATORVASTATIN CALCIUM 20 MG/1
20 TABLET, FILM COATED ORAL DAILY
Qty: 90 TABLET | Refills: 1 | Status: SHIPPED | OUTPATIENT
Start: 2025-06-23

## 2025-06-23 NOTE — TELEPHONE ENCOUNTER
PCP: Félix Gonzalez APRN - NP    Last appt: 2/27/2025    Future Appointments   Date Time Provider Department Center   6/27/2025  3:30 PM Félix Gonzalez APRN - NP [facility] DE[facility]       Requested Prescriptions     Pending Prescriptions Disp Refills    atorvastatin (LIPITOR) 20 MG tablet [Pharmacy Med Name: ATORVASTATIN 20 MG TABLET] 90 tablet 1     Sig: TAKE 1 TABLET BY MOUTH EVERY DAY

## 2025-07-07 ENCOUNTER — OFFICE VISIT (OUTPATIENT)
Facility: CLINIC | Age: 45
End: 2025-07-07
Payer: COMMERCIAL

## 2025-07-07 VITALS
HEART RATE: 100 BPM | HEIGHT: 69 IN | OXYGEN SATURATION: 95 % | BODY MASS INDEX: 44.88 KG/M2 | DIASTOLIC BLOOD PRESSURE: 76 MMHG | SYSTOLIC BLOOD PRESSURE: 128 MMHG | RESPIRATION RATE: 16 BRPM | WEIGHT: 303 LBS | TEMPERATURE: 98.5 F

## 2025-07-07 DIAGNOSIS — R41.840 DIFFICULTY CONCENTRATING: ICD-10-CM

## 2025-07-07 DIAGNOSIS — Z00.00 ROUTINE PHYSICAL EXAMINATION: Primary | ICD-10-CM

## 2025-07-07 DIAGNOSIS — F90.0 ADHD, PREDOMINANTLY INATTENTIVE TYPE: ICD-10-CM

## 2025-07-07 DIAGNOSIS — I10 ESSENTIAL HYPERTENSION: ICD-10-CM

## 2025-07-07 DIAGNOSIS — E66.813 CLASS 3 SEVERE OBESITY WITH SERIOUS COMORBIDITY AND BODY MASS INDEX (BMI) OF 40.0 TO 44.9 IN ADULT, UNSPECIFIED OBESITY TYPE (HCC): ICD-10-CM

## 2025-07-07 DIAGNOSIS — F17.210 CIGARETTE NICOTINE DEPENDENCE WITHOUT COMPLICATION: ICD-10-CM

## 2025-07-07 DIAGNOSIS — Z79.899 LONG-TERM CURRENT USE OF STIMULANT: ICD-10-CM

## 2025-07-07 DIAGNOSIS — E78.00 PURE HYPERCHOLESTEROLEMIA: ICD-10-CM

## 2025-07-07 DIAGNOSIS — F43.0 STRESS REACTION: ICD-10-CM

## 2025-07-07 PROBLEM — E66.9 OBESITY, UNSPECIFIED: Status: ACTIVE | Noted: 2021-04-06

## 2025-07-07 PROCEDURE — 3078F DIAST BP <80 MM HG: CPT | Performed by: NURSE PRACTITIONER

## 2025-07-07 PROCEDURE — 99396 PREV VISIT EST AGE 40-64: CPT | Performed by: NURSE PRACTITIONER

## 2025-07-07 PROCEDURE — 3074F SYST BP LT 130 MM HG: CPT | Performed by: NURSE PRACTITIONER

## 2025-07-07 RX ORDER — BUPROPION HYDROCHLORIDE 150 MG/1
150 TABLET ORAL EVERY MORNING
Qty: 90 TABLET | Refills: 1 | Status: SHIPPED | OUTPATIENT
Start: 2025-07-07

## 2025-07-07 NOTE — PROGRESS NOTES
Chief Complaint   Patient presents with    Annual Exam       SUBJECTIVE:    Dayne Case is a 44 y.o. male who is here today for a routine physical exam as well as follow up appointment regarding current medical conditions including: Class III severe obesity, essential hypertension, pure hypercholesterolemia, cigarette nicotine dependence, ADHD, and long-term use of stimulant therapy.  He is not fasting today.    The patient states he is trying to be watchful of his diet presently.  He states he is avoiding sides and excess calories, and has been doing some walking for exercise.  He has dropped approximately 2 pounds since his last appointment.    He remains on metoprolol succinate and valsartan for blood pressure management.  His blood pressure has remained stable and in good control overall.  He continues to take atorvastatin daily for management of his cholesterol and tolerates this well.  He denies any recent episodes of chest pain, chest pressure, shortness of breath, headaches, dizziness, blurred vision, palpitations, or syncope episodes.    He remains on Adderall for management of his ADHD.  He states the medication continues to be effective and helpful overall.  He states the medication helps him with his ability to focus his attention, decrease his distractibility, and feels that he is able to complete tasks in a more timely manner.    Last Weight Metrics:      7/7/2025     2:58 PM 2/27/2025     3:15 PM 1/15/2025    11:36 AM 12/3/2024     8:33 AM 5/28/2024     8:23 AM 3/25/2024     1:40 PM 11/28/2023     2:54 PM   Weight Loss Metrics   Height 5' 9.02\" 5' 9.02\" 5' 9.016\" 5' 9\"  5' 9\" 5' 9\"   Weight - Scale 303 lbs 305 lbs 10 oz 298 lbs 13 oz 299 lbs 13 oz 311 lbs 10 oz 311 lbs 10 oz 335 lbs   BMI (Calculated) 44.8 kg/m2 45.2 kg/m2 44.2 kg/m2 44.4 kg/m2 0 kg/m2 46.1 kg/m2 49.6 kg/m2       Current Outpatient Medications   Medication Sig Dispense Refill    buPROPion (WELLBUTRIN XL) 150 MG extended release 
Dayne Case is a 44 y.o. male     Chief Complaint   Patient presents with    Annual Exam       /76   Pulse 100   Temp 98.5 °F (36.9 °C) (Oral)   Resp 16   Ht 1.753 m (5' 9.02\")   Wt (!) 137.4 kg (303 lb)   SpO2 95%   BMI 44.72 kg/m²     Health Maintenance Due   Topic Date Due    Hepatitis B vaccine (1 of 3 - 19+ 3-dose series) Never done    DTaP/Tdap/Td vaccine (1 - Tdap) Never done    Pneumococcal 0-49 years Vaccine (1 of 2 - PCV) Never done    COVID-19 Vaccine (3 - 2024-25 season) 09/01/2024         \"Have you been to the ER, urgent care clinic since your last visit?  Hospitalized since your last visit?\"    NO    “Have you seen or consulted any other health care providers outside of [facility] since your last visit?”    NO                   
Oriented - self; Oriented - place; Oriented - time

## 2025-07-14 ENCOUNTER — LAB (OUTPATIENT)
Facility: CLINIC | Age: 45
End: 2025-07-14

## 2025-07-14 DIAGNOSIS — E78.00 PURE HYPERCHOLESTEROLEMIA: ICD-10-CM

## 2025-07-14 DIAGNOSIS — I10 ESSENTIAL HYPERTENSION: ICD-10-CM

## 2025-07-15 LAB
BASOPHILS # BLD AUTO: 0.1 X10E3/UL (ref 0–0.2)
BASOPHILS NFR BLD AUTO: 1 %
EOSINOPHIL # BLD AUTO: 0.3 X10E3/UL (ref 0–0.4)
EOSINOPHIL NFR BLD AUTO: 2 %
ERYTHROCYTE [DISTWIDTH] IN BLOOD BY AUTOMATED COUNT: 13.3 % (ref 11.6–15.4)
HCT VFR BLD AUTO: 50.1 % (ref 37.5–51)
HGB BLD-MCNC: 16.3 G/DL (ref 13–17.7)
IMM GRANULOCYTES # BLD AUTO: 0.1 X10E3/UL (ref 0–0.1)
IMM GRANULOCYTES NFR BLD AUTO: 1 %
LYMPHOCYTES # BLD AUTO: 3.5 X10E3/UL (ref 0.7–3.1)
LYMPHOCYTES NFR BLD AUTO: 24 %
MCH RBC QN AUTO: 31.1 PG (ref 26.6–33)
MCHC RBC AUTO-ENTMCNC: 32.5 G/DL (ref 31.5–35.7)
MCV RBC AUTO: 96 FL (ref 79–97)
MONOCYTES # BLD AUTO: 1 X10E3/UL (ref 0.1–0.9)
MONOCYTES NFR BLD AUTO: 7 %
NEUTROPHILS # BLD AUTO: 9.4 X10E3/UL (ref 1.4–7)
NEUTROPHILS NFR BLD AUTO: 65 %
PLATELET # BLD AUTO: 339 X10E3/UL (ref 150–450)
RBC # BLD AUTO: 5.24 X10E6/UL (ref 4.14–5.8)
WBC # BLD AUTO: 14.3 X10E3/UL (ref 3.4–10.8)

## 2025-07-16 LAB
ALBUMIN SERPL-MCNC: 4.2 G/DL (ref 4.1–5.1)
ALP SERPL-CCNC: 116 IU/L (ref 44–121)
ALT SERPL-CCNC: 24 IU/L (ref 0–44)
AST SERPL-CCNC: 23 IU/L (ref 0–40)
BILIRUB SERPL-MCNC: <0.2 MG/DL (ref 0–1.2)
BUN SERPL-MCNC: 13 MG/DL (ref 6–24)
BUN/CREAT SERPL: 13 (ref 9–20)
CALCIUM SERPL-MCNC: 9.4 MG/DL (ref 8.7–10.2)
CHLORIDE SERPL-SCNC: 100 MMOL/L (ref 96–106)
CHOLEST SERPL-MCNC: 185 MG/DL (ref 100–199)
CO2 SERPL-SCNC: 21 MMOL/L (ref 20–29)
CREAT SERPL-MCNC: 1.04 MG/DL (ref 0.76–1.27)
EGFRCR SERPLBLD CKD-EPI 2021: 90 ML/MIN/1.73
GLOBULIN SER CALC-MCNC: 3.1 G/DL (ref 1.5–4.5)
GLUCOSE SERPL-MCNC: 89 MG/DL (ref 70–99)
HDLC SERPL-MCNC: 55 MG/DL
LDLC SERPL CALC-MCNC: 108 MG/DL (ref 0–99)
POTASSIUM SERPL-SCNC: 5.2 MMOL/L (ref 3.5–5.2)
PROT SERPL-MCNC: 7.3 G/DL (ref 6–8.5)
SODIUM SERPL-SCNC: 137 MMOL/L (ref 134–144)
TRIGL SERPL-MCNC: 122 MG/DL (ref 0–149)
VLDLC SERPL CALC-MCNC: 22 MG/DL (ref 5–40)

## 2025-07-21 DIAGNOSIS — F90.0 ADHD, PREDOMINANTLY INATTENTIVE TYPE: ICD-10-CM

## 2025-07-21 RX ORDER — DEXTROAMPHETAMINE SACCHARATE, AMPHETAMINE ASPARTATE MONOHYDRATE, DEXTROAMPHETAMINE SULFATE AND AMPHETAMINE SULFATE 7.5; 7.5; 7.5; 7.5 MG/1; MG/1; MG/1; MG/1
30 CAPSULE, EXTENDED RELEASE ORAL EVERY MORNING
Qty: 30 CAPSULE | Refills: 0 | Status: SHIPPED | OUTPATIENT
Start: 2025-07-21 | End: 2025-08-20

## 2025-07-21 RX ORDER — DEXTROAMPHETAMINE SACCHARATE, AMPHETAMINE ASPARTATE, DEXTROAMPHETAMINE SULFATE AND AMPHETAMINE SULFATE 2.5; 2.5; 2.5; 2.5 MG/1; MG/1; MG/1; MG/1
TABLET ORAL
Qty: 30 TABLET | Refills: 0 | Status: SHIPPED | OUTPATIENT
Start: 2025-07-21 | End: 2025-08-21

## 2025-08-19 DIAGNOSIS — F90.0 ADHD, PREDOMINANTLY INATTENTIVE TYPE: ICD-10-CM

## 2025-08-19 RX ORDER — DEXTROAMPHETAMINE SACCHARATE, AMPHETAMINE ASPARTATE, DEXTROAMPHETAMINE SULFATE AND AMPHETAMINE SULFATE 2.5; 2.5; 2.5; 2.5 MG/1; MG/1; MG/1; MG/1
TABLET ORAL
Qty: 30 TABLET | Refills: 0 | Status: SHIPPED | OUTPATIENT
Start: 2025-08-19 | End: 2025-09-19

## 2025-08-19 RX ORDER — DEXTROAMPHETAMINE SACCHARATE, AMPHETAMINE ASPARTATE MONOHYDRATE, DEXTROAMPHETAMINE SULFATE AND AMPHETAMINE SULFATE 7.5; 7.5; 7.5; 7.5 MG/1; MG/1; MG/1; MG/1
30 CAPSULE, EXTENDED RELEASE ORAL EVERY MORNING
Qty: 30 CAPSULE | Refills: 0 | Status: SHIPPED | OUTPATIENT
Start: 2025-08-19 | End: 2025-09-18